# Patient Record
Sex: MALE | Race: WHITE | NOT HISPANIC OR LATINO | Employment: OTHER | URBAN - METROPOLITAN AREA
[De-identification: names, ages, dates, MRNs, and addresses within clinical notes are randomized per-mention and may not be internally consistent; named-entity substitution may affect disease eponyms.]

---

## 2017-01-14 ENCOUNTER — APPOINTMENT (OUTPATIENT)
Dept: RADIOLOGY | Facility: MEDICAL CENTER | Age: 62
DRG: 087 | End: 2017-01-14
Attending: EMERGENCY MEDICINE
Payer: COMMERCIAL

## 2017-01-14 ENCOUNTER — RESOLUTE PROFESSIONAL BILLING HOSPITAL PROF FEE (OUTPATIENT)
Dept: HOSPITALIST | Facility: MEDICAL CENTER | Age: 62
End: 2017-01-14
Payer: COMMERCIAL

## 2017-01-14 ENCOUNTER — HOSPITAL ENCOUNTER (INPATIENT)
Facility: MEDICAL CENTER | Age: 62
LOS: 1 days | DRG: 087 | End: 2017-01-15
Attending: EMERGENCY MEDICINE | Admitting: INTERNAL MEDICINE
Payer: COMMERCIAL

## 2017-01-14 DIAGNOSIS — S09.90XA CLOSED HEAD INJURY, INITIAL ENCOUNTER: ICD-10-CM

## 2017-01-14 DIAGNOSIS — R47.81 SLURRED SPEECH: ICD-10-CM

## 2017-01-14 DIAGNOSIS — R55 SYNCOPE, UNSPECIFIED SYNCOPE TYPE: ICD-10-CM

## 2017-01-14 LAB
ALBUMIN SERPL BCP-MCNC: 2.6 G/DL (ref 3.2–4.9)
ALBUMIN/GLOB SERPL: 0.5 G/DL
ALP SERPL-CCNC: 89 U/L (ref 30–99)
ALT SERPL-CCNC: 24 U/L (ref 2–50)
ANION GAP SERPL CALC-SCNC: 10 MMOL/L (ref 0–11.9)
APTT PPP: 27.3 SEC (ref 24.7–36)
AST SERPL-CCNC: 20 U/L (ref 12–45)
BASOPHILS # BLD AUTO: 0.6 % (ref 0–1.8)
BASOPHILS # BLD: 0.06 K/UL (ref 0–0.12)
BILIRUB SERPL-MCNC: 0.5 MG/DL (ref 0.1–1.5)
BNP SERPL-MCNC: 10 PG/ML (ref 0–100)
BUN SERPL-MCNC: 16 MG/DL (ref 8–22)
CALCIUM SERPL-MCNC: 9.2 MG/DL (ref 8.4–10.2)
CHLORIDE SERPL-SCNC: 103 MMOL/L (ref 96–112)
CO2 SERPL-SCNC: 24 MMOL/L (ref 20–33)
CREAT SERPL-MCNC: 1 MG/DL (ref 0.5–1.4)
EOSINOPHIL # BLD AUTO: 0.47 K/UL (ref 0–0.51)
EOSINOPHIL NFR BLD: 4.7 % (ref 0–6.9)
ERYTHROCYTE [DISTWIDTH] IN BLOOD BY AUTOMATED COUNT: 39.3 FL (ref 35.9–50)
GFR SERPL CREATININE-BSD FRML MDRD: >60 ML/MIN/1.73 M 2
GLOBULIN SER CALC-MCNC: 4.8 G/DL (ref 1.9–3.5)
GLUCOSE SERPL-MCNC: 88 MG/DL (ref 65–99)
HCT VFR BLD AUTO: 42.1 % (ref 42–52)
HGB BLD-MCNC: 14.9 G/DL (ref 14–18)
IMM GRANULOCYTES # BLD AUTO: 0.05 K/UL (ref 0–0.11)
IMM GRANULOCYTES NFR BLD AUTO: 0.5 % (ref 0–0.9)
INR PPP: 0.86 (ref 0.87–1.13)
LYMPHOCYTES # BLD AUTO: 3.15 K/UL (ref 1–4.8)
LYMPHOCYTES NFR BLD: 31.6 % (ref 22–41)
MCH RBC QN AUTO: 30.3 PG (ref 27–33)
MCHC RBC AUTO-ENTMCNC: 35.4 G/DL (ref 33.7–35.3)
MCV RBC AUTO: 85.7 FL (ref 81.4–97.8)
MONOCYTES # BLD AUTO: 0.76 K/UL (ref 0–0.85)
MONOCYTES NFR BLD AUTO: 7.6 % (ref 0–13.4)
NEUTROPHILS # BLD AUTO: 5.47 K/UL (ref 1.82–7.42)
NEUTROPHILS NFR BLD: 55 % (ref 44–72)
NRBC # BLD AUTO: 0 K/UL
NRBC BLD AUTO-RTO: 0 /100 WBC
PLATELET # BLD AUTO: 226 K/UL (ref 164–446)
PMV BLD AUTO: 10.1 FL (ref 9–12.9)
POTASSIUM SERPL-SCNC: 4.2 MMOL/L (ref 3.6–5.5)
PROT SERPL-MCNC: 7.4 G/DL (ref 6–8.2)
PROTHROMBIN TIME: 11.5 SEC (ref 12–14.6)
RBC # BLD AUTO: 4.91 M/UL (ref 4.7–6.1)
SODIUM SERPL-SCNC: 137 MMOL/L (ref 135–145)
TROPONIN I SERPL-MCNC: <0.02 NG/ML (ref 0–0.04)
WBC # BLD AUTO: 10 K/UL (ref 4.8–10.8)

## 2017-01-14 PROCEDURE — 80053 COMPREHEN METABOLIC PANEL: CPT

## 2017-01-14 PROCEDURE — 99223 1ST HOSP IP/OBS HIGH 75: CPT | Performed by: INTERNAL MEDICINE

## 2017-01-14 PROCEDURE — 770020 HCHG ROOM/CARE - TELE (206)

## 2017-01-14 PROCEDURE — 83880 ASSAY OF NATRIURETIC PEPTIDE: CPT

## 2017-01-14 PROCEDURE — 99407 BEHAV CHNG SMOKING > 10 MIN: CPT | Performed by: INTERNAL MEDICINE

## 2017-01-14 PROCEDURE — 85025 COMPLETE CBC W/AUTO DIFF WBC: CPT

## 2017-01-14 PROCEDURE — 70450 CT HEAD/BRAIN W/O DYE: CPT

## 2017-01-14 PROCEDURE — 71010 DX-CHEST-PORTABLE (1 VIEW): CPT

## 2017-01-14 PROCEDURE — 36415 COLL VENOUS BLD VENIPUNCTURE: CPT

## 2017-01-14 PROCEDURE — 84484 ASSAY OF TROPONIN QUANT: CPT

## 2017-01-14 PROCEDURE — 94760 N-INVAS EAR/PLS OXIMETRY 1: CPT

## 2017-01-14 PROCEDURE — 99285 EMERGENCY DEPT VISIT HI MDM: CPT

## 2017-01-14 PROCEDURE — 85730 THROMBOPLASTIN TIME PARTIAL: CPT

## 2017-01-14 PROCEDURE — 85610 PROTHROMBIN TIME: CPT

## 2017-01-14 PROCEDURE — 93005 ELECTROCARDIOGRAM TRACING: CPT | Performed by: EMERGENCY MEDICINE

## 2017-01-14 ASSESSMENT — PAIN SCALES - GENERAL
PAINLEVEL_OUTOF10: ASSUMED PAIN PRESENT
PAINLEVEL_OUTOF10: 5

## 2017-01-14 NOTE — IP AVS SNAPSHOT
1/15/2017          Rodolfo SOLIS III [facility]  [address]  [place] [address]    Dear Rodolfo SOLIS:    [facility] wants to ensure your discharge home is safe and you or your loved ones have had all your questions answered regarding your care after you leave the hospital.    You may receive a telephone call within two days of your discharge.  This call is to make certain you understand your discharge instructions as well as ensure we provided you with the best care possible during your stay with us.     The call will only last approximately 3-5 minutes and will be done by a nurse.    Once again, we want to ensure your discharge home is safe and that you have a clear understanding of any next steps in your care.  If you have any questions or concerns, please do not hesitate to contact us, we are here for you.  Thank you for choosing [facility] for your healthcare needs.    Sincerely,    Joe Longoria    [facility]

## 2017-01-14 NOTE — IP AVS SNAPSHOT
" After Visit Summary                                                                                                                  Name:Rodolfo Saunders  Medical Record Number:[id number]  CSN: [id number]    YOB: 1955   Age: 61 y.o.  Sex: male  HT:1.905 m (6' 3\") WT: 93 kg (205 lb 0.4 oz)          Admit Date: 1/14/2017     Discharge Date:   Today's Date: 1/15/2017  Attending Doctor:  Willy Kauffman M.D.                  Allergies:  Review of patient's allergies indicates no known allergies.            Discharge Instructions       Discharge Instructions    Discharged to home by car with relative. Discharged via wheelchair, hospital escort: Yes.  Special equipment needed: Not Applicable    Be sure to schedule a follow-up appointment with your primary care doctor or any specialists as instructed.     Discharge Plan:   Smoking Cessation Offered: Patient Counseled  Influenza Vaccine Indication: Patient Refuses    I understand that a diet low in cholesterol, fat, and sodium is recommended for good health. Unless I have been given specific instructions below for another diet, I accept this instruction as my diet prescription.   Other diet: Regular    Special Instructions: None    · Is patient discharged on Warfarin / Coumadin?   No     · Is patient Post Blood Transfusion?  No    Depression / Suicide Risk    As you are discharged from this [facility] facility, it is important to learn how to keep safe from harming yourself.    Recognize the warning signs:  · Abrupt changes in personality, positive or negative- including increase in energy   · Giving away possessions  · Change in eating patterns- significant weight changes-  positive or negative  · Change in sleeping patterns- unable to sleep or sleeping all the time   · Unwillingness or inability to communicate  · Depression  · Unusual sadness, discouragement and loneliness  · Talk of wanting to die  · Neglect of personal appearance   · Rebelliousness- " reckless behavior  · Withdrawal from people/activities they love  · Confusion- inability to concentrate     If you or a loved one observes any of these behaviors or has concerns about self-harm, here's what you can do:  · Talk about it- your feelings and reasons for harming yourself  · Remove any means that you might use to hurt yourself (examples: pills, rope, extension cords, firearm)  · Get professional help from the community (Mental Health, Substance Abuse, psychological counseling)  · Do not be alone:Call your Safe Contact- someone whom you trust who will be there for you.  · Call your local CRISIS HOTLINE 981-4573 or 105-131-3221  · Call your local Children's Mobile Crisis Response Team [place] (456) 670-3569 or www.Marketsync  · Call the toll free National Suicide Prevention Hotlines   · National Suicide Prevention Lifeline 089-374-WLDF (2144)  · [employer] App Line Network 800-JWERJRD (670-4115)    Syncope, commonly known as fainting, is a temporary loss of consciousness. It occurs when the blood flow to the brain is reduced. Vasovagal syncope (also called neurocardiogenic syncope) is a fainting spell in which the blood flow to the brain is reduced because of a sudden drop in heart rate and blood pressure. Vasovagal syncope occurs when the brain and the cardiovascular system (blood vessels) do not adequately communicate and respond to each other. This is the most common cause of fainting. It often occurs in response to fear or some other type of emotional or physical stress. The body has a reaction in which the heart starts beating too slowly or the blood vessels expand, reducing blood pressure. This type of fainting spell is generally considered harmless. However, injuries can occur if a person takes a sudden fall during a fainting spell.   CAUSES   Vasovagal syncope occurs when a person's blood pressure and heart rate decrease suddenly, usually in response to a trigger. Many things and  situations can trigger an episode. Some of these include:   · Pain.    · Fear.    · The sight of blood or medical procedures, such as blood being drawn from a vein.    · Common activities, such as coughing, swallowing, stretching, or going to the bathroom.    · Emotional stress.    · Prolonged standing, especially in a warm environment.    · Lack of sleep or rest.    · Prolonged lack of food.    · Prolonged lack of fluids.    · Recent illness.  · The use of certain drugs that affect blood pressure, such as cocaine, alcohol, marijuana, inhalants, and opiates.    SYMPTOMS   Before the fainting episode, you may:   · Feel dizzy or light headed.    · Become pale.  · Sense that you are going to faint.    · Feel like the room is spinning.    · Have tunnel vision, only seeing directly in front of you.    · Feel sick to your stomach (nauseous).    · See spots or slowly lose vision.    · Hear ringing in your ears.    · Have a headache.    · Feel warm and sweaty.    · Feel a sensation of pins and needles.  During the fainting spell, you will generally be unconscious for no longer than a couple minutes before waking up and returning to normal. If you get up too quickly before your body can recover, you may faint again. Some twitching or jerky movements may occur during the fainting spell.   DIAGNOSIS   Your health care provider will ask about your symptoms, take a medical history, and perform a physical exam. Various tests may be done to rule out other causes of fainting. These may include blood tests and tests to check the heart, such as electrocardiography, echocardiography, and possibly an electrophysiology study. When other causes have been ruled out, a test may be done to check the body's response to changes in position (tilt table test).  TREATMENT   Most cases of vasovagal syncope do not require treatment. Your health care provider may recommend ways to avoid fainting triggers and may provide home strategies for  preventing fainting. If you must be exposed to a possible trigger, you can drink additional fluids to help reduce your chances of having an episode of vasovagal syncope. If you have warning signs of an oncoming episode, you can respond by positioning yourself favorably (lying down).  If your fainting spells continue, you may be given medicines to prevent fainting. Some medicines may help make you more resistant to repeated episodes of vasovagal syncope. Special exercises or compression stockings may be recommended. In rare cases, the surgical placement of a pacemaker is considered.  HOME CARE INSTRUCTIONS   · Learn to identify the warning signs of vasovagal syncope.    · Sit or lie down at the first warning sign of a fainting spell. If sitting, put your head down between your legs. If you lie down, swing your legs up in the air to increase blood flow to the brain.    · Avoid hot tubs and saunas.  · Avoid prolonged standing.  · Drink enough fluids to keep your urine clear or pale yellow. Avoid caffeine.  · Increase salt in your diet as directed by your health care provider.    · If you have to stand for a long time, perform movements such as:    · Crossing your legs.    · Flexing and stretching your leg muscles.    · Squatting.    · Moving your legs.    · Bending over.    · Only take over-the-counter or prescription medicines as directed by your health care provider. Do not suddenly stop any medicines without asking your health care provider first.   SEEK MEDICAL CARE IF:   · Your fainting spells continue or happen more frequently in spite of treatment.    · You lose consciousness for more than a couple minutes.  · You have fainting spells during or after exercising or after being startled.    · You have new symptoms that occur with the fainting spells, such as:    · Shortness of breath.  · Chest pain.    · Irregular heartbeat.    · You have episodes of twitching or jerky movements that last longer than a few  seconds.  · You have episodes of twitching or jerky movements without obvious fainting.  SEEK IMMEDIATE MEDICAL CARE IF:   · You have injuries or bleeding after a fainting spell.    · You have episodes of twitching or jerky movements that last longer than 5 minutes.    · You have more than one spell of twitching or jerky movements before returning to consciousness after fainting.     This information is not intended to replace advice given to you by your health care provider. Make sure you discuss any questions you have with your health care provider.     Document Released: 12/04/2013 Document Revised: 05/03/2016 Document Reviewed: 12/04/2013  Greasebook Interactive Patient Education ©2016 [employer]    Sinusitis, Adult  Sinusitis is redness, soreness, and inflammation of the paranasal sinuses. Paranasal sinuses are air pockets within the bones of your face. They are located beneath your eyes, in the middle of your forehead, and above your eyes. In healthy paranasal sinuses, mucus is able to drain out, and air is able to circulate through them by way of your nose. However, when your paranasal sinuses are inflamed, mucus and air can become trapped. This can allow bacteria and other germs to grow and cause infection.  Sinusitis can develop quickly and last only a short time (acute) or continue over a long period (chronic). Sinusitis that lasts for more than 12 weeks is considered chronic.  CAUSES  Causes of sinusitis include:  · Allergies.  · Structural abnormalities, such as displacement of the cartilage that separates your nostrils (deviated septum), which can decrease the air flow through your nose and sinuses and affect sinus drainage.  · Functional abnormalities, such as when the small hairs (cilia) that line your sinuses and help remove mucus do not work properly or are not present.  SIGNS AND SYMPTOMS  Symptoms of acute and chronic sinusitis are the same. The primary symptoms are pain and pressure around the  affected sinuses. Other symptoms include:  · Upper toothache.  · Earache.  · Headache.  · Bad breath.  · Decreased sense of smell and taste.  · A cough, which worsens when you are lying flat.  · Fatigue.  · Fever.  · Thick drainage from your nose, which often is green and may contain pus (purulent).  · Swelling and warmth over the affected sinuses.  DIAGNOSIS  Your health care provider will perform a physical exam. During your exam, your health care provider may perform any of the following to help determine if you have acute sinusitis or chronic sinusitis:  · Look in your nose for signs of abnormal growths in your nostrils (nasal polyps).  · Tap over the affected sinus to check for signs of infection.  · View the inside of your sinuses using an imaging device that has a light attached (endoscope).  If your health care provider suspects that you have chronic sinusitis, one or more of the following tests may be recommended:  · Allergy tests.  · Nasal culture. A sample of mucus is taken from your nose, sent to a lab, and screened for bacteria.  · Nasal cytology. A sample of mucus is taken from your nose and examined by your health care provider to determine if your sinusitis is related to an allergy.  TREATMENT  Most cases of acute sinusitis are related to a viral infection and will resolve on their own within 10 days. Sometimes, medicines are prescribed to help relieve symptoms of both acute and chronic sinusitis. These may include pain medicines, decongestants, nasal steroid sprays, or saline sprays.  However, for sinusitis related to a bacterial infection, your health care provider will prescribe antibiotic medicines. These are medicines that will help kill the bacteria causing the infection.  Rarely, sinusitis is caused by a fungal infection. In these cases, your health care provider will prescribe antifungal medicine.  For some cases of chronic sinusitis, surgery is needed. Generally, these are cases in which  sinusitis recurs more than 3 times per year, despite other treatments.  HOME CARE INSTRUCTIONS  · Drink plenty of water. Water helps thin the mucus so your sinuses can drain more easily.  · Use a humidifier.  · Inhale steam 3-4 times a day (for example, sit in the bathroom with the shower running).  · Apply a warm, moist washcloth to your face 3-4 times a day, or as directed by your health care provider.  · Use saline nasal sprays to help moisten and clean your sinuses.  · Take medicines only as directed by your health care provider.  · If you were prescribed either an antibiotic or antifungal medicine, finish it all even if you start to feel better.  SEEK IMMEDIATE MEDICAL CARE IF:  · You have increasing pain or severe headaches.  · You have nausea, vomiting, or drowsiness.  · You have swelling around your face.  · You have vision problems.  · You have a stiff neck.  · You have difficulty breathing.     This information is not intended to replace advice given to you by your health care provider. Make sure you discuss any questions you have with your health care provider.     Document Released: 12/18/2006 Document Revised: 01/08/2016 Document Reviewed: 01/01/2013  [employer] Interactive Patient Education ©2016 Elsevier Inc.        Your appointments     Jan 16, 2017  1:20 PM   Established Patient with RUFINA Manriquez   [facility] (--)    [address]  [address]  [place] [id number] [phone]           You will be receiving a confirmation call a few days before your appointment from our automated call confirmation system.              Follow-up Information     1. Follow up with Deandre De La Cruz M.D..    Specialty:  Internal Medicine    Contact information    [address]  [place] [id number] [phone]           Discharge Medication Instructions:    Below are the medications your physician expects you to take upon discharge:    Review all your home medications and newly  ordered medications with your doctor and/or pharmacist. Follow medication instructions as directed by your doctor and/or pharmacist.    Please keep your medication list with you and share with your physician.               Medication List      CONTINUE taking these medications        Instructions    Diclofenac Sodium 1 % Gel    Apply 1 Application to affected area(s) 2 times a day as needed.   Dose:  1 Application       ibuprofen 800 MG Tabs   Last time this was given:  800 mg on 1/15/2017  1:42 AM   Commonly known as:  MOTRIN    Take 800 mg by mouth every 8 hours as needed.   Dose:  800 mg               Instructions           Diet / Nutrition:    Follow any diet instructions given to you by your doctor or the dietician, including how much salt (sodium) you are allowed each day.    If you are overweight, talk to your doctor about a weight reduction plan.    Activity:    Remain physically active following your doctor's instructions about exercise and activity.    Rest often.     Any time you become even a little tired or short of breath, SIT DOWN and rest.    Worsening Symptoms:    Report any of the following signs and symptoms to the doctor's office immediately:    *Pain of jaw, arm, or neck  *Chest pain not relieved by medication                               *Dizziness or loss of consciousness  *Difficulty breathing even when at rest   *More tired than usual                                       *Bleeding drainage or swelling of surgical site  *Swelling of feet, ankles, legs or stomach                 *Fever (>100ºF)  *Pink or blood tinged sputum  *Weight gain (3lbs/day or 5lbs /week)           *Shock from internal defibrillator (if applicable)  *Palpitations or irregular heartbeats                *Cool and/or numb extremities    Stroke Awareness    Common Risk Factors for Stroke include:    Age  Atrial Fibrillation  Carotid Artery Stenosis  Diabetes Mellitus  Excessive alcohol consumption  High blood  pressure  Overweight   Physical inactivity  Smoking    Warning signs and symptoms of a stroke include:    *Sudden numbness or weakness of the face, arm or leg (especially on one side of the body).  *Sudden confusion, trouble speaking or understanding.  *Sudden trouble seeing in one or both eyes.  *Sudden trouble walking, dizziness, loss of balance or coordination.Sudden severe headache with no known cause.    It is very important to get treatment quickly when a stroke occurs. If you experience any of the above warning signs, call 911 immediately.                   Disclaimer         Quit Smoking / Tobacco Use:    I understand the use of any tobacco products increases my chance of suffering from future heart disease or stroke and could cause other illnesses which may shorten my life. Quitting the use of tobacco products is the single most important thing I can do to improve my health. For further information on smoking / tobacco cessation call a Toll Free Quit Line at 1-384.505.5027 (*National Cancer [place]) or 1-336.626.9048 (American Lung Association) or you can access the web based program at www.lungReadmill.org.    [place] Tobacco Users Help Line:  (195) 212-3271       Toll Free: 1-644.153.9745  Quit Tobacco Program [facility] (038)579-7483    Crisis Hotline:    [place] Crisis Hotline:  8-232-FYNJEIF or 1-695.906.6160    [place] Crisis Hotline:    1-701.962.2929 or 206-246-3153    Discharge Survey:   Thank you for choosing [facility]. We hope we did everything we could to make your hospital stay a pleasant one. You may be receiving a phone survey and we would appreciate your time and participation in answering the questions. Your input is very valuable to us in our efforts to improve our service to our patients and their families.        My signature on this form indicates that:    1. I have reviewed and understand the above information.  2. My questions regarding this information have  been answered to my satisfaction.  3. I have formulated a plan with my discharge nurse to obtain my prescribed medications for home.                  Disclaimer         __________________________________                     __________       ________                       Patient Signature                                                 Date                    Time

## 2017-01-14 NOTE — IP AVS SNAPSHOT
XATA Access Code: Activation code not generated  Current XATA Status: Patient Declined    [employer]  A secure, online tool to manage your health information     [employer]’s XATA® is a secure, online tool that connects you to your personalized health information from the privacy of your home -- day or night - making it very easy for you to manage your healthcare. Once the activation process is completed, you can even access your medical information using the XATA miquel, which is available for free in the Apple Miquel store or Google Play store.     XATA provides the following levels of access (as shown below):   My Chart Features   [facility] Primary Care Doctor [facility]  Specialists [facility]  Urgent  Care Non-[facility]  Primary Care  Doctor   Email your healthcare team securely and privately 24/7 X X X X   Manage appointments: schedule your next appointment; view details of past/upcoming appointments X      Request prescription refills. X      View recent personal medical records, including lab and immunizations X X X X   View health record, including health history, allergies, medications X X X X   Read reports about your outpatient visits, procedures, consult and ER notes X X X X   See your discharge summary, which is a recap of your hospital and/or ER visit that includes your diagnosis, lab results, and care plan. X X       How to register for XATA:  1. Go to  [email or web address].  2. Click on the Sign Up Now box, which takes you to the New Member Sign Up page. You will need to provide the following information:  a. Enter your XATA Access Code exactly as it appears at the top of this page. (You will not need to use this code after you’ve completed the sign-up process. If you do not sign up before the expiration date, you must request a new code.)   b. Enter your date of birth.   c. Enter your home email address.   d. Click Submit, and follow the next screen’s instructions.  3. Create a XATA ID.  This will be your [employer] login ID and cannot be changed, so think of one that is secure and easy to remember.  4. Create a [employer] password. You can change your password at any time.  5. Enter your Password Reset Question and Answer. This can be used at a later time if you forget your password.   6. Enter your e-mail address. This allows you to receive e-mail notifications when new information is available in [employer].  7. Click Sign Up. You can now view your health information.    For assistance activating your [employer] account, call [phone]

## 2017-01-14 NOTE — IP AVS SNAPSHOT
" <p align=\"LEFT\"><IMG SRC=\"//[email or web address]\" alt=\"Image\" WIDTH=\"50%\" HEIGHT=\"200\" BORDER=\"\"></p>                   Name:Rodolfo Saunders  Medical Record Number:[id number]  CSN: [id number]    YOB: 1955   Age: 61 y.o.  Sex: male  HT:1.905 m (6' 3\") WT: 93 kg (205 lb 0.4 oz)          Admit Date: 1/14/2017     Discharge Date:   Today's Date: 1/15/2017  Attending Doctor:  Willy Kauffman M.D.                  Allergies:  Review of patient's allergies indicates no known allergies.          Your appointments     Jan 16, 2017  1:20 PM   Established Patient with RUFINA Manriquez   [facility] [place] (--)    [address]  [address]  [place] [address] [phone]           You will be receiving a confirmation call a few days before your appointment from our automated call confirmation system.              Follow-up Information     1. Follow up with Deandre De La Cruz M.D..    Specialty:  Internal Medicine    Contact information    [address]  [place] [address] [phone]           Medication List      Take these Medications        Instructions    Diclofenac Sodium 1 % Gel    Apply 1 Application to affected area(s) 2 times a day as needed.   Dose:  1 Application       ibuprofen 800 MG Tabs   Commonly known as:  MOTRIN    Take 800 mg by mouth every 8 hours as needed.   Dose:  800 mg         "

## 2017-01-15 ENCOUNTER — APPOINTMENT (OUTPATIENT)
Dept: RADIOLOGY | Facility: MEDICAL CENTER | Age: 62
DRG: 087 | End: 2017-01-15
Attending: INTERNAL MEDICINE
Payer: COMMERCIAL

## 2017-01-15 VITALS
TEMPERATURE: 97.6 F | HEART RATE: 76 BPM | BODY MASS INDEX: 25.49 KG/M2 | DIASTOLIC BLOOD PRESSURE: 82 MMHG | SYSTOLIC BLOOD PRESSURE: 152 MMHG | WEIGHT: 205.03 LBS | HEIGHT: 75 IN | OXYGEN SATURATION: 92 % | RESPIRATION RATE: 19 BRPM

## 2017-01-15 LAB
EKG IMPRESSION: NORMAL
LV EJECT FRACT  99904: 60
LV EJECT FRACT MOD 2C 99903: 59.64
LV EJECT FRACT MOD 4C 99902: 61.98
LV EJECT FRACT MOD BP 99901: 61.71

## 2017-01-15 PROCEDURE — 93880 EXTRACRANIAL BILAT STUDY: CPT

## 2017-01-15 PROCEDURE — 700111 HCHG RX REV CODE 636 W/ 250 OVERRIDE (IP): Performed by: INTERNAL MEDICINE

## 2017-01-15 PROCEDURE — 70551 MRI BRAIN STEM W/O DYE: CPT

## 2017-01-15 PROCEDURE — 700102 HCHG RX REV CODE 250 W/ 637 OVERRIDE(OP): Performed by: INTERNAL MEDICINE

## 2017-01-15 PROCEDURE — 700105 HCHG RX REV CODE 258: Performed by: INTERNAL MEDICINE

## 2017-01-15 PROCEDURE — 93306 TTE W/DOPPLER COMPLETE: CPT

## 2017-01-15 PROCEDURE — 99239 HOSP IP/OBS DSCHRG MGMT >30: CPT | Performed by: HOSPITALIST

## 2017-01-15 PROCEDURE — A9270 NON-COVERED ITEM OR SERVICE: HCPCS | Performed by: INTERNAL MEDICINE

## 2017-01-15 PROCEDURE — 93306 TTE W/DOPPLER COMPLETE: CPT | Mod: 26 | Performed by: INTERNAL MEDICINE

## 2017-01-15 RX ORDER — ONDANSETRON 4 MG/1
4 TABLET, ORALLY DISINTEGRATING ORAL EVERY 4 HOURS PRN
Status: DISCONTINUED | OUTPATIENT
Start: 2017-01-15 | End: 2017-01-15 | Stop reason: HOSPADM

## 2017-01-15 RX ORDER — ACETAMINOPHEN 325 MG/1
650 TABLET ORAL EVERY 6 HOURS PRN
Status: DISCONTINUED | OUTPATIENT
Start: 2017-01-15 | End: 2017-01-15 | Stop reason: HOSPADM

## 2017-01-15 RX ORDER — PROMETHAZINE HYDROCHLORIDE 25 MG/1
12.5-25 SUPPOSITORY RECTAL EVERY 4 HOURS PRN
Status: DISCONTINUED | OUTPATIENT
Start: 2017-01-15 | End: 2017-01-15 | Stop reason: HOSPADM

## 2017-01-15 RX ORDER — BISACODYL 10 MG
10 SUPPOSITORY, RECTAL RECTAL
Status: DISCONTINUED | OUTPATIENT
Start: 2017-01-15 | End: 2017-01-15 | Stop reason: HOSPADM

## 2017-01-15 RX ORDER — AMOXICILLIN 250 MG
1 CAPSULE ORAL
Status: DISCONTINUED | OUTPATIENT
Start: 2017-01-15 | End: 2017-01-15 | Stop reason: HOSPADM

## 2017-01-15 RX ORDER — AMOXICILLIN 250 MG
1 CAPSULE ORAL NIGHTLY
Status: DISCONTINUED | OUTPATIENT
Start: 2017-01-15 | End: 2017-01-15 | Stop reason: HOSPADM

## 2017-01-15 RX ORDER — ENEMA 19; 7 G/133ML; G/133ML
1 ENEMA RECTAL
Status: DISCONTINUED | OUTPATIENT
Start: 2017-01-15 | End: 2017-01-15 | Stop reason: HOSPADM

## 2017-01-15 RX ORDER — LACTULOSE 20 G/30ML
30 SOLUTION ORAL
Status: DISCONTINUED | OUTPATIENT
Start: 2017-01-15 | End: 2017-01-15 | Stop reason: HOSPADM

## 2017-01-15 RX ORDER — HEPARIN SODIUM 5000 [USP'U]/ML
5000 INJECTION, SOLUTION INTRAVENOUS; SUBCUTANEOUS EVERY 8 HOURS
Status: DISCONTINUED | OUTPATIENT
Start: 2017-01-15 | End: 2017-01-15 | Stop reason: HOSPADM

## 2017-01-15 RX ORDER — ONDANSETRON 2 MG/ML
4 INJECTION INTRAMUSCULAR; INTRAVENOUS EVERY 4 HOURS PRN
Status: DISCONTINUED | OUTPATIENT
Start: 2017-01-15 | End: 2017-01-15 | Stop reason: HOSPADM

## 2017-01-15 RX ORDER — PROMETHAZINE HYDROCHLORIDE 25 MG/1
12.5-25 TABLET ORAL EVERY 4 HOURS PRN
Status: DISCONTINUED | OUTPATIENT
Start: 2017-01-15 | End: 2017-01-15 | Stop reason: HOSPADM

## 2017-01-15 RX ORDER — IBUPROFEN 400 MG/1
800 TABLET ORAL EVERY 8 HOURS PRN
Status: DISCONTINUED | OUTPATIENT
Start: 2017-01-15 | End: 2017-01-15 | Stop reason: HOSPADM

## 2017-01-15 RX ORDER — DOCUSATE SODIUM 100 MG/1
100 CAPSULE, LIQUID FILLED ORAL EVERY MORNING
Status: DISCONTINUED | OUTPATIENT
Start: 2017-01-15 | End: 2017-01-15 | Stop reason: HOSPADM

## 2017-01-15 RX ORDER — SODIUM CHLORIDE 9 MG/ML
INJECTION, SOLUTION INTRAVENOUS CONTINUOUS
Status: DISCONTINUED | OUTPATIENT
Start: 2017-01-15 | End: 2017-01-15 | Stop reason: HOSPADM

## 2017-01-15 RX ADMIN — IBUPROFEN 800 MG: 400 TABLET, FILM COATED ORAL at 01:42

## 2017-01-15 RX ADMIN — HEPARIN SODIUM 5000 UNITS: 5000 INJECTION, SOLUTION INTRAVENOUS; SUBCUTANEOUS at 06:41

## 2017-01-15 RX ADMIN — SODIUM CHLORIDE: 9 INJECTION, SOLUTION INTRAVENOUS at 01:53

## 2017-01-15 RX ADMIN — HEPARIN SODIUM 5000 UNITS: 5000 INJECTION, SOLUTION INTRAVENOUS; SUBCUTANEOUS at 13:25

## 2017-01-15 ASSESSMENT — PAIN SCALES - GENERAL
PAINLEVEL_OUTOF10: 3
PAINLEVEL_OUTOF10: 5

## 2017-01-15 ASSESSMENT — LIFESTYLE VARIABLES
ALCOHOL_USE: NO
EVER_SMOKED: YES
PACK_YEARS: 5

## 2017-01-15 NOTE — PROGRESS NOTES
Late entry:    0700 Report received from Alex HDZ. Patient resting in bed.      1241: MRI, ECHO, CAROTID DOPPLER all complete.

## 2017-01-15 NOTE — ED NOTES
Pt states three nights ago he was sitting outside drinking coffee and smoking a cigarette, stood up, walked inside et began to feel dizzy.  He fell et his his head, then had a similar episode last night.  C/O headache, also is worried about blood sugar issues et feels it may be related.

## 2017-01-15 NOTE — PROGRESS NOTES
Pt's tele summary: sinus rhythm w/no ectopy.      HR 77      AK interval 0.20  QRS complex 0.10  QT interval 0.38      Primary nurse to continue further monitoring and keep in communication with monitor tech.

## 2017-01-15 NOTE — H&P
PRIMARY CARE PHYSICIAN:  Dr. Grayson    CHIEF COMPLAINT:  Syncope.    HISTORY OF PRESENT ILLNESS:  The patient is 61-year-old male who came to the   ER with above.  Per the patient, over the past 3 days, he has one episode of   syncope attack and 2 episodes of slurred speech.  Per the patient, about 3   days ago while he was at the kitchen, he suddenly felt dizzy and fell and   passed out.  Per wife, he passed out for a few minutes and when he regained   consciousness, he was confused for a few minutes and she noticed that he has   heavy speech.  Today, again he has another episode of heavy speech and slurred   speech.  So they finally decided to come to the ER.  In the ER, he has a CT   scan of the head done and it was negative.  Apart from that, the patient   denied any fever, chest pain, palpitation, abdominal pain, bowel or bladder   problem, or any neurological deficit.    REVIEW OF SYSTEMS:  All other systems were reviewed and negative.  The rest   per HPI.    ALLERGY HISTORY:  No known drug allergy.    PAST MEDICAL HISTORY:  No significant past medical history.    PAST SURGICAL HISTORY:  Sinus surgery and hernia repair.    FAMILY HISTORY:  No pertinent family history.    SOCIAL HISTORY:  The patient smoked 5 to 6 cigarettes a day.  Denies illicit   drug abuse or alcohol abuse.    OUTPATIENT MEDICATION:  Ibuprofen as needed and diclofenac sodium gel as   needed.    PHYSICAL EXAMINATION:  VITAL SIGNS:  Temperature 97.2, pulse 81, respiratory rate 16, blood pressure   158/100, satting 96% on room air.  GENERAL:  Alert, communicative.  HEENT:  Normocephalic, atraumatic.  NECK:  Supple.  No neck gland enlargement.  CARDIOVASCULAR:  Normal first and second sound.  No murmur.  RESPIRATORY:  Normal respiratory effort.  No wheezing.  ABDOMEN:  Soft, bowel sounds present.  Nontender.  CENTRAL NERVOUS SYSTEM:  Cranial nerves II-XII intact.  No neurological   deficit.  EXTREMITIES:  No edema, clubbing, or  cyanosis.  PSYCHIATRIC:  Normal affect and mood.  Alert and oriented x4.  SKIN:  Warm and moist.    LABORATORY DATA:  CBC within normal limit.  Chem panel is within normal limit.    CT scan of the head is within normal limit.  Chest x-ray is negative.    ASSESSMENT AND PLAN:  1.  Syncope episode, likely vasovagal versus arrhythmia related.  The patient   will be monitored closely on telemetry for possible arrhythmia related   syncope.  Also, we will do a complete syncope workup including MRI of the   brain, carotid duplex, and echocardiogram.  2.  If all the blood tests came back negative, then the patient probably will   need to get outpatient Holter monitor from primary care physician.  3.  Nicotine dependence.  Smoking cessation education was given.  I spent 10   minutes on explaining complication, treatment option.       ____________________________________     MD NOEMI ZARAGOZA / JENNIFER    DD:  01/15/2017 00:21:22  DT:  01/15/2017 02:05:34    D#:  [id number]  Job#:  [id number]

## 2017-01-15 NOTE — ED PROVIDER NOTES
ED Provider Note    CHIEF COMPLAINT  Chief Complaint   Patient presents with   • Head Ache   • Fall   • Head Injury   • Dizziness   • Syncope       HPI  Rodolfo Saunders is a 61 y.o. male who presents to the ED with a syncopal episode. Prior to his ankle episode the patient has been complaining of headaches fairly constant over the last 5 days diffusely throughout the head. 3 nights ago the patient was sitting outside approximately 1:00 in the morning drinking a cup of coffee and smoking a cigarette, he got up, started feeling lightheaded, tried to put his hand on a table however slipped fell and struck his head and had a syncopal episode. Unknown how long the patient was out, the patient woke up the patient was disoriented and had slurred speech. The slurred speech lasted for 5-10 minutes, the patient is complaining of some fatigue, and the patient went to bed. Since that time the patient continues to have headaches, has been feeling somewhat lightheaded and fatigued. Today early this morning the patient had another episode where he stood up after drinking coffee and smoking a cigarette and had lightheadedness and slurred speech however did not completely pass out. Patient denies any other numbness tingling weakness except for some chronic right hand pain and tingling. Continues to have the headache, denies any chest pain, did have an episode of shortness of breath with the 1st syncopal episode. Denies any abdominal pains, nausea vomiting.    REVIEW OF SYSTEMS  See HPI for further details. All other systems are negative.     PAST MEDICAL HISTORY  History reviewed. No pertinent past medical history.    FAMILY HISTORY  Family History   Problem Relation Age of Onset   • Diabetes Father    • Heart Disease Neg Hx    • Cancer Neg Hx        SOCIAL HISTORY  Social History     Social History   • Marital Status:      Spouse Name: N/A   • Number of Children: N/A   • Years of Education: N/A     Social History Main  "Topics   • Smoking status: Current Some Day Smoker -- 0.25 packs/day for 20 years     Types: Cigarettes   • Smokeless tobacco: Never Used   • Alcohol Use: No   • Drug Use: No   • Sexual Activity:     Partners: Female     Other Topics Concern   • None     Social History Narrative       SURGICAL HISTORY  Past Surgical History   Procedure Laterality Date   • Other orthopedic surgery Right      right ankle surgery   • Sinuscopy     • Hernia repair Right      inguinal hernia       CURRENT MEDICATIONS  Home Medications     Reviewed by Charlie Rodriguez R.N. (Registered Nurse) on 01/14/17 at 1906  Med List Status: Complete    Medication Last Dose Status    Diclofenac Sodium 1 % Gel 1/10/2017 Active    ibuprofen (MOTRIN) 800 MG Tab 1/14/2017 Active                ALLERGIES  No Known Allergies    PHYSICAL EXAM  VITAL SIGNS: /100 mmHg  Pulse 81  Temp(Src) 36.2 °C (97.2 °F)  Resp 16  Ht 1.905 m (6' 3\")  Wt 93 kg (205 lb 0.4 oz)  BMI 25.63 kg/m2  SpO2 94%  Constitutional: Well developed, Well nourished, mild distress, Non-toxic appearance.   HENT: Abrasion to left forehead, no hematoma  Eyes: EOMI, PERRLA  Neck: Normal range of motion, No tenderness, Supple, No stridor.   Cardiovascular: Normal heart rate, Normal rhythm.   Thorax & Lungs: Normal breath sounds, No respiratory distress, No wheezing, No chest tenderness.   Abdomen: Bowel sounds normal, Soft, No tenderness, No masses, No pulsatile masses.   Skin: Warm, Dry, No erythema, No rash.   Back: No tenderness, No CVA tenderness.   Extremities: Intact distal pulses, No edema, tenderness throughout the right hand with decreased ability to make a fist  Neurologic: Awake alert speech is clear, well-nourished 2 through 12 grossly intact muscle strength is 5 out of 5 throughout, normal sensation  Psychiatric: Affect normal, Judgment normal, Mood normal.     EKG   by myself shows normal sinus rhythm heart rate of 80, normal P waves, normal QRS, peaked T " waves throughout concerning for hyperkalemia, normal ST and T waves, normal axis, normal intervals, overall nonspecific EKG, no old EKG for comparison    RADIOLOGY/PROCEDURES  DX-CHEST-PORTABLE (1 VIEW)   Final Result      No acute cardiac or pulmonary abnormalities are identified.      CT-HEAD W/O   Final Result      Head CT without contrast within normal limits. No evidence of acute cerebral infarction, hemorrhage or mass lesion.            COURSE & MEDICAL DECISION MAKING  Pertinent Labs & Imaging studies reviewed. (See chart for details)  Patient is coming in with a syncopal episode although this is likely orthostatic syncope the patient has been having a persistent headache and had intermittent episodes of slurred speech that I am concerned about a TIA, we got a CT scan of the head, basic laboratory tests.    CT scan is negative laboratory tests are unremarkable discussed the case with the hospitalist for admission to hospital.    FINAL IMPRESSION  1. Closed head injury, initial encounter    2. Syncope, unspecified syncope type    3. Slurred speech            This dictation was created using voice recognition software. The accuracy of the dictation is limited to the abilities of the software. I expect there may be some errors of grammar and possibly content. The nursing notes were reviewed and certain aspects of this information were incorporated into this note.    Electronically signed by: Raulito Jeffries, 1/14/2017 8:29 PM

## 2017-01-15 NOTE — PROGRESS NOTES
0000    Report taken, assumed care of Pt. Pt c/o ongoing headache and L ankle pain, will medicate according to MAR. Reviewed POC for the shift and all questions answered. Call light and possessions within reach. Pt denies needs at this time. Wife BS. VSS. Will continue to monitor.

## 2017-01-16 ENCOUNTER — OFFICE VISIT (OUTPATIENT)
Dept: MEDICAL GROUP | Facility: PHYSICIAN GROUP | Age: 62
End: 2017-01-16
Payer: COMMERCIAL

## 2017-01-16 VITALS
HEIGHT: 75 IN | WEIGHT: 200 LBS | RESPIRATION RATE: 16 BRPM | TEMPERATURE: 97.9 F | DIASTOLIC BLOOD PRESSURE: 80 MMHG | OXYGEN SATURATION: 94 % | SYSTOLIC BLOOD PRESSURE: 148 MMHG | HEART RATE: 98 BPM | BODY MASS INDEX: 24.87 KG/M2

## 2017-01-16 DIAGNOSIS — F17.210 CIGARETTE NICOTINE DEPENDENCE WITHOUT COMPLICATION: ICD-10-CM

## 2017-01-16 DIAGNOSIS — M25.571 CHRONIC PAIN OF RIGHT ANKLE: ICD-10-CM

## 2017-01-16 DIAGNOSIS — I10 ESSENTIAL HYPERTENSION: ICD-10-CM

## 2017-01-16 DIAGNOSIS — R89.2 ABNORMAL DRUG SCREEN: ICD-10-CM

## 2017-01-16 DIAGNOSIS — I67.1 INTERNAL CAROTID ANEURYSM: ICD-10-CM

## 2017-01-16 DIAGNOSIS — G89.29 CHRONIC PAIN OF RIGHT ANKLE: ICD-10-CM

## 2017-01-16 DIAGNOSIS — R55 SYNCOPE, UNSPECIFIED SYNCOPE TYPE: ICD-10-CM

## 2017-01-16 PROCEDURE — 99214 OFFICE O/P EST MOD 30 MIN: CPT | Performed by: NURSE PRACTITIONER

## 2017-01-16 RX ORDER — HYDROCODONE BITARTRATE AND ACETAMINOPHEN 10; 325 MG/1; MG/1
1 TABLET ORAL EVERY 8 HOURS PRN
Qty: 60 TAB | Refills: 0 | Status: SHIPPED | OUTPATIENT
Start: 2017-01-16

## 2017-01-16 RX ORDER — HYDROCODONE BITARTRATE AND ACETAMINOPHEN 10; 325 MG/1; MG/1
1-2 TABLET ORAL EVERY 6 HOURS PRN
COMMUNITY
End: 2017-01-16 | Stop reason: SDUPTHER

## 2017-01-16 RX ORDER — LISINOPRIL 10 MG/1
10 TABLET ORAL DAILY
Qty: 30 TAB | Refills: 0 | Status: SHIPPED | OUTPATIENT
Start: 2017-01-16 | End: 2017-02-13

## 2017-01-16 NOTE — DISCHARGE SUMMARY
ADMISSION DIAGNOSIS:  Syncope.    DISCHARGE DIAGNOSES:  1.  Syncope, resolved.  2.  Nicotine dependence.    CONSULTS:  None.    PROCEDURES:  None.    HOSPITAL COURSE:  Patient is a 61-year-old male who has a history of ongoing   tobacco dependence.  He presented to the hospital after a syncopal event.    Patient reports that he went outside to have a cigarette and along with that   he had some coffee and not enough fluid intake throughout the day and he   became lightheaded and had a syncopal event.  He has never had episodes such   as this before.  During the hospitalization, he was monitored on telemetry.    There were no events on telemetry.  He had a full workup, which was   unremarkable and the workup included echocardiogram, CT head, MRI brain as   well as carotid ultrasound.  To note, his carotid ultrasound did show an area   of focal defect at the origin of the right internal carotid artery.  To   evaluate this further, he may need a CT angiogram at some point.  I discussed   this with the patient.  He states he is going to follow up with his primary   care physician to go over this study and to make a plan from there and he   would like to be discharged home today.  He is going to be discharged home in   stable condition.    DISCHARGE ACTIVITY:  As tolerated.    DISCHARGE DIET:  Regular.    DISCHARGE MEDICATIONS:  He should continue with Motrin as needed and   diclofenac.    TOTAL TIME OF DISCHARGE:  Thirty-five minutes.       ____________________________________     MD DYLAN Johnson / JENNIFER    DD:  01/15/2017 16:03:03  DT:  01/15/2017 20:39:33    D#:  [id number]  Job#:  [id number]

## 2017-01-16 NOTE — PROGRESS NOTES
Discharge instructions, medications reviewed with patient. Patient given discharge instructions,  IV removed.

## 2017-01-16 NOTE — MR AVS SNAPSHOT
"        Rodolfo Saunders III   2017 1:20 PM   Office Visit   MRN: [id number]    Department:  [facility]   Dept Phone:  [phone]    Description:  Male : 1955   Provider:  RUFINA Manriquez           Reason for Visit     Follow-Up 1 Month Follow Up       Allergies as of 2017     No Known Allergies      You were diagnosed with     Internal carotid aneurysm   [606858]       Essential hypertension   [4758386]       Chronic pain of right ankle   [4563747]       Cigarette nicotine dependence without complication   [536992]         Vital Signs     Blood Pressure Pulse Temperature Respirations Height Weight    148/80 mmHg 98 36.6 °C (97.9 °F) 16 1.905 m (6' 3\") 90.719 kg (200 lb)    Body Mass Index Oxygen Saturation Smoking Status             25.00 kg/m2 94% Current Some Day Smoker         Basic Information     Date Of Birth Sex Race Ethnicity Preferred Language    1955 Male White Non- English      Your appointments     2017  7:00 AM   Established Patient with DINO ManriquezPJuanaRNELSON   [facility] (--)    [address]  [address]  [facility] [address] [phone]           You will be receiving a confirmation call a few days before your appointment from our automated call confirmation system.              Problem List              ICD-10-CM Priority Class Noted - Resolved    Essential hypertension I10   2016 - Present    Cigarette nicotine dependence without complication F17.210   2016 - Present    Chronic pain of right ankle M25.571, G89.29   2016 - Present    Routine general medical examination at a health care facility Z00.00   2016 - Present    Syncope R55   2017 - Present    Internal carotid aneurysm I67.1   2017 - Present      Health Maintenance        Date Due Completion Dates    IMM PNEUMOCOCCAL 19-64 (ADULT) MEDIUM RISK SERIES (1 of 1 - PPSV23) 1976 ---    COLONOSCOPY 2007 ---  "    IMM INFLUENZA (1) 1/1/2018 (Originally 9/1/2016) ---    IMM DTaP/Tdap/Td Vaccine (2 - Td) 1/16/2024 1/16/2014            Current Immunizations     Tdap Vaccine 1/16/2014      Below and/or attached are the medications your provider expects you to take. Review all of your home medications and newly ordered medications with your provider and/or pharmacist. Follow medication instructions as directed by your provider and/or pharmacist. Please keep your medication list with you and share with your provider. Update the information when medications are discontinued, doses are changed, or new medications (including over-the-counter products) are added; and carry medication information at all times in the event of emergency situations     Allergies:  No Known Allergies          Medications  Valid as of: January 16, 2017 -  2:14 PM    Generic Name Brand Name Tablet Size Instructions for use    Diclofenac Sodium (Gel) Diclofenac Sodium 1 % Apply 1 Application to affected area(s) 2 times a day as needed.        Hydrocodone-Acetaminophen (Tab) NORCO  MG Take 1 Tab by mouth every 8 hours as needed.        Ibuprofen (Tab) MOTRIN 800 MG Take 800 mg by mouth every 8 hours as needed.        Lisinopril (Tab) PRINIVIL 10 MG Take 1 Tab by mouth every day.        .                 Medicines prescribed today were sent to:     [id number] - [place] - [address] [address]    Phone: [phone] Fax: [phone]    Open 24 Hours?: No      Medication refill instructions:       If your prescription bottle indicates you have medication refills left, it is not necessary to call your provider’s office. Please contact your pharmacy and they will refill your medication.    If your prescription bottle indicates you do not have any refills left, you may request refills at any time through one of the following ways: The online [facility] system (except Urgent Care), by calling your provider’s office, or by asking  your pharmacy to contact your provider’s office with a refill request. Medication refills are processed only during regular business hours and may not be available until the next business day. Your provider may request additional information or to have a follow-up visit with you prior to refilling your medication.   *Please Note: Medication refills are assigned a new Rx number when refilled electronically. Your pharmacy may indicate that no refills were authorized even though a new prescription for the same medication is available at the pharmacy. Please request the medicine by name with the pharmacy before contacting your provider for a refill.        Your To Do List     Future Labs/Procedures Complete By Expires    CT-CTA NECK WITH & W/O-POST PROCESSING  As directed 1/16/2018         MyChart Status: Patient Declined

## 2017-01-16 NOTE — Clinical Note
January 17, 2017        Rodolfo SOLIS III [facility]  [address]  [place] [address]        Dear Rodolfo SOLIS:    During your office visit in December a Urine Drug Screen was performed.  The results of the urine drug screen were abnormal showing positive marijuana which is an illegal substance according to federal regulations.  In light of these abnormalities and due to changes in prescribing policy and limitations placed by local, state and federal agencies, neither I, nor any other provider in the [facility] system will be able to fill your controlled substances. The current prescription will allow you time to find another provider to fill this medication, or I will be happy to refer you to pain management. The other option would be to use this prescription to wean yourself off of the medication.     Dr. De La Cruz will remain as your Primary Care Provider for your other medical problems that do not require a prescription for a controlled substance.    Thank you for your understanding in this matter.    If you have any questions or concerns, please don't hesitate to call.        Sincerely,        Dayton Duncan, SAUL.P.R.N.    Electronically Signed

## 2017-01-16 NOTE — DISCHARGE INSTRUCTIONS
Discharge Instructions    Discharged to home by car with relative. Discharged via wheelchair, hospital escort: Yes.  Special equipment needed: Not Applicable    Be sure to schedule a follow-up appointment with your primary care doctor or any specialists as instructed.     Discharge Plan:   Smoking Cessation Offered: Patient Counseled  Influenza Vaccine Indication: Patient Refuses    I understand that a diet low in cholesterol, fat, and sodium is recommended for good health. Unless I have been given specific instructions below for another diet, I accept this instruction as my diet prescription.   Other diet: Regular    Special Instructions: None    · Is patient discharged on Warfarin / Coumadin?   No     · Is patient Post Blood Transfusion?  No    Depression / Suicide Risk    As you are discharged from this [facility] facility, it is important to learn how to keep safe from harming yourself.    Recognize the warning signs:  · Abrupt changes in personality, positive or negative- including increase in energy   · Giving away possessions  · Change in eating patterns- significant weight changes-  positive or negative  · Change in sleeping patterns- unable to sleep or sleeping all the time   · Unwillingness or inability to communicate  · Depression  · Unusual sadness, discouragement and loneliness  · Talk of wanting to die  · Neglect of personal appearance   · Rebelliousness- reckless behavior  · Withdrawal from people/activities they love  · Confusion- inability to concentrate     If you or a loved one observes any of these behaviors or has concerns about self-harm, here's what you can do:  · Talk about it- your feelings and reasons for harming yourself  · Remove any means that you might use to hurt yourself (examples: pills, rope, extension cords, firearm)  · Get professional help from the community (Mental Health, Substance Abuse, psychological counseling)  · Do not be alone:Call your Safe Contact- someone whom you  trust who will be there for you.  · Call your local CRISIS HOTLINE 386-4423 or 542-827-0697  · Call your local Children's Mobile Crisis Response Team [place] (308) 883-7639 or www.[employer]  · Call the toll free National Suicide Prevention Hotlines   · National Suicide Prevention Lifeline 219-450-RBNQ (9705)  · National Hope Line Network 800-SUICIDE (050-2088)    Syncope, commonly known as fainting, is a temporary loss of consciousness. It occurs when the blood flow to the brain is reduced. Vasovagal syncope (also called neurocardiogenic syncope) is a fainting spell in which the blood flow to the brain is reduced because of a sudden drop in heart rate and blood pressure. Vasovagal syncope occurs when the brain and the cardiovascular system (blood vessels) do not adequately communicate and respond to each other. This is the most common cause of fainting. It often occurs in response to fear or some other type of emotional or physical stress. The body has a reaction in which the heart starts beating too slowly or the blood vessels expand, reducing blood pressure. This type of fainting spell is generally considered harmless. However, injuries can occur if a person takes a sudden fall during a fainting spell.   CAUSES   Vasovagal syncope occurs when a person's blood pressure and heart rate decrease suddenly, usually in response to a trigger. Many things and situations can trigger an episode. Some of these include:   · Pain.    · Fear.    · The sight of blood or medical procedures, such as blood being drawn from a vein.    · Common activities, such as coughing, swallowing, stretching, or going to the bathroom.    · Emotional stress.    · Prolonged standing, especially in a warm environment.    · Lack of sleep or rest.    · Prolonged lack of food.    · Prolonged lack of fluids.    · Recent illness.  · The use of certain drugs that affect blood pressure, such as cocaine, alcohol, marijuana, inhalants, and  opiates.    SYMPTOMS   Before the fainting episode, you may:   · Feel dizzy or light headed.    · Become pale.  · Sense that you are going to faint.    · Feel like the room is spinning.    · Have tunnel vision, only seeing directly in front of you.    · Feel sick to your stomach (nauseous).    · See spots or slowly lose vision.    · Hear ringing in your ears.    · Have a headache.    · Feel warm and sweaty.    · Feel a sensation of pins and needles.  During the fainting spell, you will generally be unconscious for no longer than a couple minutes before waking up and returning to normal. If you get up too quickly before your body can recover, you may faint again. Some twitching or jerky movements may occur during the fainting spell.   DIAGNOSIS   Your health care provider will ask about your symptoms, take a medical history, and perform a physical exam. Various tests may be done to rule out other causes of fainting. These may include blood tests and tests to check the heart, such as electrocardiography, echocardiography, and possibly an electrophysiology study. When other causes have been ruled out, a test may be done to check the body's response to changes in position (tilt table test).  TREATMENT   Most cases of vasovagal syncope do not require treatment. Your health care provider may recommend ways to avoid fainting triggers and may provide home strategies for preventing fainting. If you must be exposed to a possible trigger, you can drink additional fluids to help reduce your chances of having an episode of vasovagal syncope. If you have warning signs of an oncoming episode, you can respond by positioning yourself favorably (lying down).  If your fainting spells continue, you may be given medicines to prevent fainting. Some medicines may help make you more resistant to repeated episodes of vasovagal syncope. Special exercises or compression stockings may be recommended. In rare cases, the surgical placement of a  pacemaker is considered.  HOME CARE INSTRUCTIONS   · Learn to identify the warning signs of vasovagal syncope.    · Sit or lie down at the first warning sign of a fainting spell. If sitting, put your head down between your legs. If you lie down, swing your legs up in the air to increase blood flow to the brain.    · Avoid hot tubs and saunas.  · Avoid prolonged standing.  · Drink enough fluids to keep your urine clear or pale yellow. Avoid caffeine.  · Increase salt in your diet as directed by your health care provider.    · If you have to stand for a long time, perform movements such as:    · Crossing your legs.    · Flexing and stretching your leg muscles.    · Squatting.    · Moving your legs.    · Bending over.    · Only take over-the-counter or prescription medicines as directed by your health care provider. Do not suddenly stop any medicines without asking your health care provider first.   SEEK MEDICAL CARE IF:   · Your fainting spells continue or happen more frequently in spite of treatment.    · You lose consciousness for more than a couple minutes.  · You have fainting spells during or after exercising or after being startled.    · You have new symptoms that occur with the fainting spells, such as:    · Shortness of breath.  · Chest pain.    · Irregular heartbeat.    · You have episodes of twitching or jerky movements that last longer than a few seconds.  · You have episodes of twitching or jerky movements without obvious fainting.  SEEK IMMEDIATE MEDICAL CARE IF:   · You have injuries or bleeding after a fainting spell.    · You have episodes of twitching or jerky movements that last longer than 5 minutes.    · You have more than one spell of twitching or jerky movements before returning to consciousness after fainting.     This information is not intended to replace advice given to you by your health care provider. Make sure you discuss any questions you have with your health care provider.     Document  Released: 12/04/2013 Document Revised: 05/03/2016 Document Reviewed: 12/04/2013  [employer] Patient Education ©2016 [employer]    Sinusitis, Adult  Sinusitis is redness, soreness, and inflammation of the paranasal sinuses. Paranasal sinuses are air pockets within the bones of your face. They are located beneath your eyes, in the middle of your forehead, and above your eyes. In healthy paranasal sinuses, mucus is able to drain out, and air is able to circulate through them by way of your nose. However, when your paranasal sinuses are inflamed, mucus and air can become trapped. This can allow bacteria and other germs to grow and cause infection.  Sinusitis can develop quickly and last only a short time (acute) or continue over a long period (chronic). Sinusitis that lasts for more than 12 weeks is considered chronic.  CAUSES  Causes of sinusitis include:  · Allergies.  · Structural abnormalities, such as displacement of the cartilage that separates your nostrils (deviated septum), which can decrease the air flow through your nose and sinuses and affect sinus drainage.  · Functional abnormalities, such as when the small hairs (cilia) that line your sinuses and help remove mucus do not work properly or are not present.  SIGNS AND SYMPTOMS  Symptoms of acute and chronic sinusitis are the same. The primary symptoms are pain and pressure around the affected sinuses. Other symptoms include:  · Upper toothache.  · Earache.  · Headache.  · Bad breath.  · Decreased sense of smell and taste.  · A cough, which worsens when you are lying flat.  · Fatigue.  · Fever.  · Thick drainage from your nose, which often is green and may contain pus (purulent).  · Swelling and warmth over the affected sinuses.  DIAGNOSIS  Your health care provider will perform a physical exam. During your exam, your health care provider may perform any of the following to help determine if you have acute sinusitis or chronic sinusitis:  · Look  in your nose for signs of abnormal growths in your nostrils (nasal polyps).  · Tap over the affected sinus to check for signs of infection.  · View the inside of your sinuses using an imaging device that has a light attached (endoscope).  If your health care provider suspects that you have chronic sinusitis, one or more of the following tests may be recommended:  · Allergy tests.  · Nasal culture. A sample of mucus is taken from your nose, sent to a lab, and screened for bacteria.  · Nasal cytology. A sample of mucus is taken from your nose and examined by your health care provider to determine if your sinusitis is related to an allergy.  TREATMENT  Most cases of acute sinusitis are related to a viral infection and will resolve on their own within 10 days. Sometimes, medicines are prescribed to help relieve symptoms of both acute and chronic sinusitis. These may include pain medicines, decongestants, nasal steroid sprays, or saline sprays.  However, for sinusitis related to a bacterial infection, your health care provider will prescribe antibiotic medicines. These are medicines that will help kill the bacteria causing the infection.  Rarely, sinusitis is caused by a fungal infection. In these cases, your health care provider will prescribe antifungal medicine.  For some cases of chronic sinusitis, surgery is needed. Generally, these are cases in which sinusitis recurs more than 3 times per year, despite other treatments.  HOME CARE INSTRUCTIONS  · Drink plenty of water. Water helps thin the mucus so your sinuses can drain more easily.  · Use a humidifier.  · Inhale steam 3-4 times a day (for example, sit in the bathroom with the shower running).  · Apply a warm, moist washcloth to your face 3-4 times a day, or as directed by your health care provider.  · Use saline nasal sprays to help moisten and clean your sinuses.  · Take medicines only as directed by your health care provider.  · If you were prescribed either an  antibiotic or antifungal medicine, finish it all even if you start to feel better.  SEEK IMMEDIATE MEDICAL CARE IF:  · You have increasing pain or severe headaches.  · You have nausea, vomiting, or drowsiness.  · You have swelling around your face.  · You have vision problems.  · You have a stiff neck.  · You have difficulty breathing.     This information is not intended to replace advice given to you by your health care provider. Make sure you discuss any questions you have with your health care provider.     Document Released: 12/18/2006 Document Revised: 01/08/2016 Document Reviewed: 01/01/2013  [employer] Patient Education ©2016 [employer]

## 2017-01-17 PROBLEM — R89.2 ABNORMAL DRUG SCREEN: Status: ACTIVE | Noted: 2017-01-17

## 2017-01-17 NOTE — ASSESSMENT & PLAN NOTE
Chronic right ankle pain   Current pain control: good, 3-4/10  Adverse effects: Patient denies sedation, dizziness, constipation or other adverse effects with medication.  Physical functioning: fair  Mood: excellent  Family and social relationships: excellent  Sleep pattern: excellent  Overall functioning: good  Nonnarcotic treatments that are being used: ibuprofen, diclofenac gel,      Pain management agreement initiated and signed on: 12/16/2016  Last dose of narcotic medication: yesterday afternoon  Most recent urine drug screen done 12/16/2016, results Positive for Marijuana, otherwise as expected. 60 hydrocodone-acetaminophen 10/325mg provided to patient as a bridge while he finds another provider for pain management. Will call patient to further discuss options going forward related to drug screen positive for marijuana.  Aberrant Behaviors: None  I have reviewed the medical records, the Prescription Monitoring Program and I have determined that is medically indicated.    I have advised patient to keep medication in a safe place and to not drive with medication.

## 2017-01-17 NOTE — ASSESSMENT & PLAN NOTE
Incidental finding on carotid Doppler, recommended follow-up with CTA. CTA ordered at this time. According to Doppler results it is indeterminate whether this is a pseudoaneurysm or plaque.

## 2017-01-17 NOTE — ASSESSMENT & PLAN NOTE
Patient was seen in the emergency room 1/14. For an episode of syncope, dizziness was not consistent with vertigo. Patient states he has not had any further symptoms since that one episode. All lab work, EKG, and imaging noted to be within normal limits. Patient denies weakness, dizziness, palpitations, shortness of breath. Patient encouraged to follow up in the emergency room his symptoms return or worse. Patient denies anxiety related to this incident.

## 2017-01-17 NOTE — PROGRESS NOTES
Chief Complaint   Patient presents with   • Follow-Up     1 Month Follow Up        HISTORY OF PRESENT ILLNESS: Patient is a 61 y.o. male established patient who presents today to follow-up on hypertension and other issues.    Essential hypertension  Blood pressure today is 148/80, patient states he has not checked his blood pressure at home. Patient denies headache, blurry vision, palpitations, chest pain. States he has continued to take ibuprofen as needed. Discussed goal blood pressure of less than 140/90, discussed concern with chronically elevated uncontrolled blood pressure. Plan to start lisinopril 10 mg daily. Patient was counseled on possible side effects of medication including cough, nausea. Patient will report any concerning side effects or symptoms with the medication.    Cigarette nicotine dependence without complication  Patient is a current smoker. States that he has tried quitting several times. States he has been unsuccessful. Patient does not currently have desire to try to quit smoking.    Chronic pain of right ankle  Chronic right ankle pain   Current pain control: good, 3-4/10  Adverse effects: Patient denies sedation, dizziness, constipation or other adverse effects with medication.  Physical functioning: fair  Mood: excellent  Family and social relationships: excellent  Sleep pattern: excellent  Overall functioning: good  Nonnarcotic treatments that are being used: ibuprofen, diclofenac gel,      Pain management agreement initiated and signed on: 12/16/2016  Last dose of narcotic medication: yesterday afternoon  Most recent urine drug screen done 12/16/2016, results Positive for Marijuana, otherwise as expected. 60 hydrocodone-acetaminophen 10/325mg provided to patient as a bridge while he finds another provider for pain management. Will call patient to further discuss options going forward related to drug screen positive for marijuana.  Aberrant Behaviors: None  I have reviewed the medical  records, the Prescription Monitoring Program and I have determined that is medically indicated.    I have advised patient to keep medication in a safe place and to not drive with medication.      Syncope  Patient was seen in the emergency room 1/14. For an episode of syncope, dizziness was not consistent with vertigo. Patient states he has not had any further symptoms since that one episode. All lab work, EKG, and imaging noted to be within normal limits. Patient denies weakness, dizziness, palpitations, shortness of breath. Patient encouraged to follow up in the emergency room his symptoms return or worse. Patient denies anxiety related to this incident.    Internal carotid aneurysm  Incidental finding on carotid Doppler, recommended follow-up with CTA. CTA ordered at this time. According to Doppler results it is indeterminate whether this is a pseudoaneurysm or plaque.      Patient Active Problem List    Diagnosis Date Noted   • Internal carotid aneurysm 01/16/2017   • Syncope 01/14/2017   • Essential hypertension 12/16/2016   • Cigarette nicotine dependence without complication 12/16/2016   • Chronic pain of right ankle 12/16/2016   • Routine general medical examination at a health care facility 12/16/2016       Allergies:Review of patient's allergies indicates no known allergies.    Current Outpatient Prescriptions   Medication Sig Dispense Refill   • lisinopril (PRINIVIL) 10 MG Tab Take 1 Tab by mouth every day. 30 Tab 0   • hydrocodone/acetaminophen (NORCO)  MG Tab Take 1 Tab by mouth every 8 hours as needed. 60 Tab 0   • ibuprofen (MOTRIN) 800 MG Tab Take 800 mg by mouth every 8 hours as needed.     • Diclofenac Sodium 1 % Gel Apply 1 Application to affected area(s) 2 times a day as needed.       No current facility-administered medications for this visit.       Social History   Substance Use Topics   • Smoking status: Current Some Day Smoker -- 0.25 packs/day for 20 years     Types: Cigarettes   •  "Smokeless tobacco: Never Used   • Alcohol Use: No       No family status information on file.     Family History   Problem Relation Age of Onset   • Diabetes Father    • Heart Disease Neg Hx    • Cancer Neg Hx        Review of Systems:   Constitutional:  Negative for fever, chills, weight loss and malaise/fatigue.   HEENT:  Negative for ear pain, nosebleeds, congestion, sore throat and neck pain.    Eyes:  Negative for blurred vision.   Respiratory:  Negative for cough, sputum production, shortness of breath and wheezing.    Cardiovascular:  Negative for chest pain, palpitations, orthopnea and leg swelling.   Gastrointestinal:  Negative for heartburn, nausea, vomiting and abdominal pain.   Genitourinary:  Negative for dysuria, urgency and frequency.   Musculoskeletal:  Negative for myalgias, back pain and positive joint pain.   Skin:  Negative for rash and itching.   Neurological:  Negative for dizziness, tingling, tremors, sensory change, focal weakness and headaches.   Endo/Heme/Allergies:  Does not bruise/bleed easily.   Psychiatric/Behavioral:  Negative for depression, suicidal ideas and memory loss.  The patient is not nervous/anxious and does not have insomnia.    All other systems reviewed and are negative except as in HPI.    Exam:  Blood pressure 148/80, pulse 98, temperature 36.6 °C (97.9 °F), resp. rate 16, height 1.905 m (6' 3\"), weight 90.719 kg (200 lb), SpO2 94 %.  General:  Normal appearing. No distress.  HEENT:  Normocephalic. Eyes conjunctiva clear lids without ptosis, pupils equal and reactive to light accommodation, ears normal shape and contour, canals are clear bilaterally, tympanic membranes are benign, nasal mucosa benign, oropharynx is without erythema, edema or exudates.   Neck:  Supple without JVD or bruit. Thyroid is not enlarged.  Pulmonary:  Clear to ausculation.  Normal effort. No rales, ronchi, or wheezing.  Cardiovascular:  Regular rate and rhythm without murmur. Carotid and radial " pulses are intact and equal bilaterally.  Abdomen:  Soft, nontender, nondistended. Normal bowel sounds. Liver and spleen are not palpable  Neurologic:  Grossly nonfocal  Lymph:  No cervical, supraclavicular or axillary lymph nodes are palpable  Skin:  Warm and dry.  No obvious lesions.  Musculoskeletal:  Normal gait. No extremity cyanosis, clubbing, or edema. Significantly deformed right ankle noted, no tenderness to palpation, range of motion is significantly limited in the right ankle versus the left.  Patient reports pain with range of motion.   Psych:  Normal mood and affect. Alert and oriented x3. Judgment and insight is normal.      Medical decision-making and discussion: Rodolfo is a generally well-appearing 61-year-old male patient here today to follow-up regarding hypertension. Patient blood pressure remains elevated at 148/80 lisinopril 10 mg daily started at this time. Patient will follow-up in one month to discuss blood pressure medication as well as results from CTA to further assess internal carotid defect, will likely refer to vascular at that time. Patient continues to take Norco 10/325 mg up to twice daily, drug screen noted to be positive for marijuana, will call patient to further discuss drug screen results. Will encourage patient to accept referral to pain management, or make a plan to wean patient off this medication. ER precautions provided to patient at this time.     Please note that this dictation was created using voice recognition software. I have made every reasonable attempt to correct obvious errors, but I expect that there are errors of grammar and possibly content that I did not discover before finalizing the note.    Assessment/Plan:  1. Internal carotid aneurysm  CT-CTA NECK WITH & W/O-POST PROCESSING   2. Essential hypertension  lisinopril (PRINIVIL) 10 MG Tab   3. Chronic pain of right ankle     4. Cigarette nicotine dependence without complication     5. Syncope, unspecified  syncope type            I have placed the below orders and discussed them with an approved delegating provider. The MA is performing the below orders under the direction of Dr. Qureshi.

## 2017-01-17 NOTE — ASSESSMENT & PLAN NOTE
Blood pressure today is 148/80, patient states he has not checked his blood pressure at home. Patient denies headache, blurry vision, palpitations, chest pain. States he has continued to take ibuprofen as needed. Discussed goal blood pressure of less than 140/90, discussed concern with chronically elevated uncontrolled blood pressure. Plan to start lisinopril 10 mg daily. Patient was counseled on possible side effects of medication including cough, nausea. Patient will report any concerning side effects or symptoms with the medication.

## 2017-01-19 ENCOUNTER — TELEPHONE (OUTPATIENT)
Dept: MEDICAL GROUP | Facility: PHYSICIAN GROUP | Age: 62
End: 2017-01-19

## 2017-01-19 NOTE — TELEPHONE ENCOUNTER
Called and left patient a 2nd voicemail to give me a call back. Trying to get a hold of patient because [place] needs to talk about his drug test results. Going to call again and after that i will send a letter. LM

## 2017-02-13 ENCOUNTER — OFFICE VISIT (OUTPATIENT)
Dept: MEDICAL GROUP | Facility: PHYSICIAN GROUP | Age: 62
End: 2017-02-13
Payer: COMMERCIAL

## 2017-02-13 ENCOUNTER — HOSPITAL ENCOUNTER (OUTPATIENT)
Dept: LAB | Facility: MEDICAL CENTER | Age: 62
End: 2017-02-13
Attending: NURSE PRACTITIONER
Payer: COMMERCIAL

## 2017-02-13 VITALS
DIASTOLIC BLOOD PRESSURE: 78 MMHG | OXYGEN SATURATION: 96 % | BODY MASS INDEX: 25.49 KG/M2 | TEMPERATURE: 97.6 F | WEIGHT: 205 LBS | HEIGHT: 75 IN | SYSTOLIC BLOOD PRESSURE: 150 MMHG | HEART RATE: 96 BPM | RESPIRATION RATE: 16 BRPM

## 2017-02-13 DIAGNOSIS — M19.90 ARTHRITIS: ICD-10-CM

## 2017-02-13 DIAGNOSIS — I67.1 INTERNAL CAROTID ANEURYSM: ICD-10-CM

## 2017-02-13 DIAGNOSIS — I10 ESSENTIAL HYPERTENSION: ICD-10-CM

## 2017-02-13 DIAGNOSIS — G89.29 CHRONIC PAIN OF RIGHT ANKLE: ICD-10-CM

## 2017-02-13 DIAGNOSIS — R89.2 ABNORMAL DRUG SCREEN: ICD-10-CM

## 2017-02-13 DIAGNOSIS — M25.571 CHRONIC PAIN OF RIGHT ANKLE: ICD-10-CM

## 2017-02-13 DIAGNOSIS — M72.0 DUPUYTREN'S CONTRACTURE OF BOTH HANDS: ICD-10-CM

## 2017-02-13 LAB
CRP SERPL HS-MCNC: 0.06 MG/DL (ref 0–0.75)
ERYTHROCYTE [SEDIMENTATION RATE] IN BLOOD BY WESTERGREN METHOD: 15 MM/HOUR (ref 0–20)
RHEUMATOID FACT SERPL-ACNC: <10 IU/ML (ref 0–14)
URATE SERPL-MCNC: 5.8 MG/DL (ref 2.5–8.3)

## 2017-02-13 PROCEDURE — 99214 OFFICE O/P EST MOD 30 MIN: CPT | Performed by: NURSE PRACTITIONER

## 2017-02-13 PROCEDURE — 86038 ANTINUCLEAR ANTIBODIES: CPT

## 2017-02-13 PROCEDURE — 84550 ASSAY OF BLOOD/URIC ACID: CPT

## 2017-02-13 PROCEDURE — 36415 COLL VENOUS BLD VENIPUNCTURE: CPT

## 2017-02-13 PROCEDURE — 86060 ANTISTREPTOLYSIN O TITER: CPT

## 2017-02-13 PROCEDURE — 85652 RBC SED RATE AUTOMATED: CPT

## 2017-02-13 PROCEDURE — 86200 CCP ANTIBODY: CPT

## 2017-02-13 PROCEDURE — 84165 PROTEIN E-PHORESIS SERUM: CPT

## 2017-02-13 PROCEDURE — 84160 ASSAY OF PROTEIN ANY SOURCE: CPT

## 2017-02-13 PROCEDURE — 86431 RHEUMATOID FACTOR QUANT: CPT

## 2017-02-13 PROCEDURE — 86140 C-REACTIVE PROTEIN: CPT

## 2017-02-13 RX ORDER — LISINOPRIL 20 MG/1
20 TABLET ORAL DAILY
Qty: 30 TAB | Refills: 0 | Status: SHIPPED | OUTPATIENT
Start: 2017-02-13 | End: 2017-03-14 | Stop reason: SDUPTHER

## 2017-02-13 ASSESSMENT — PAIN SCALES - GENERAL: PAINLEVEL: 8=MODERATE-SEVERE PAIN

## 2017-02-13 NOTE — MR AVS SNAPSHOT
"        Rodolfo Saunders III   2017 7:00 AM   Office Visit   MRN: [id number]    Department:  [facility]   Dept Phone:  [phone]    Description:  Male : 1955   Provider:  RUFINA Manriquez           Reason for Visit     Follow-Up 1 Month Follow Up       Allergies as of 2017     No Known Allergies      You were diagnosed with     Essential hypertension   [9081255]       Arthritis   [263670]       Chronic pain of right ankle   [0106530]         Vital Signs     Blood Pressure Pulse Temperature Respirations Height Weight    150/78 mmHg 96 36.4 °C (97.6 °F) 16 1.905 m (6' 3\") 92.987 kg (205 lb)    Body Mass Index Oxygen Saturation Smoking Status             25.62 kg/m2 96% Current Some Day Smoker         Basic Information     Date Of Birth Sex Race Ethnicity Preferred Language    1955 Male White Non- English      Your appointments     Mar 07, 2017 11:20 AM   Established Patient with Deandre De La Cruz M.D.   [facility] (--)    [address]  [address]  [facility] [address] [phone]           You will be receiving a confirmation call a few days before your appointment from our automated call confirmation system.              Problem List              ICD-10-CM Priority Class Noted - Resolved    Essential hypertension I10   2016 - Present    Cigarette nicotine dependence without complication F17.210   2016 - Present    Chronic pain of right ankle M25.571, G89.29   2016 - Present    Routine general medical examination at a health care facility Z00.00   2016 - Present    Syncope R55   2017 - Present    Internal carotid aneurysm I67.1   2017 - Present    Abnormal drug screen R89.2   2017 - Present      Health Maintenance        Date Due Completion Dates    IMM PNEUMOCOCCAL 19-64 (ADULT) MEDIUM RISK SERIES (1 of 1 - PPSV23) 1976 ---    COLONOSCOPY 2007 ---    IMM INFLUENZA (1) 2018 (Originally 2016) " ---    IMM DTaP/Tdap/Td Vaccine (2 - Td) 1/16/2024 1/16/2014            Current Immunizations     Tdap Vaccine 1/16/2014      Below and/or attached are the medications your provider expects you to take. Review all of your home medications and newly ordered medications with your provider and/or pharmacist. Follow medication instructions as directed by your provider and/or pharmacist. Please keep your medication list with you and share with your provider. Update the information when medications are discontinued, doses are changed, or new medications (including over-the-counter products) are added; and carry medication information at all times in the event of emergency situations     Allergies:  No Known Allergies          Medications  Valid as of: February 13, 2017 -  7:42 AM    Generic Name Brand Name Tablet Size Instructions for use    Diclofenac Sodium (Gel) Diclofenac Sodium 1 % Apply 1 Application to affected area(s) 2 times a day as needed.        Hydrocodone-Acetaminophen (Tab) NORCO  MG Take 1 Tab by mouth every 8 hours as needed.        Ibuprofen (Tab) MOTRIN 800 MG Take 800 mg by mouth every 8 hours as needed.        Lisinopril (Tab) PRINIVIL 20 MG Take 1 Tab by mouth every day.        .                 Medicines prescribed today were sent to:     [facility]/PHARMACY #[id number] - [place] - [address] [place] [address]    Phone: [phone] Fax: [phone]    Open 24 Hours?: No      Medication refill instructions:       If your prescription bottle indicates you have medication refills left, it is not necessary to call your provider’s office. Please contact your pharmacy and they will refill your medication.    If your prescription bottle indicates you do not have any refills left, you may request refills at any time through one of the following ways: The online M/A-COM system (except Urgent Care), by calling your provider’s office, or by asking your pharmacy to contact your provider’s office with  a refill request. Medication refills are processed only during regular business hours and may not be available until the next business day. Your provider may request additional information or to have a follow-up visit with you prior to refilling your medication.   *Please Note: Medication refills are assigned a new Rx number when refilled electronically. Your pharmacy may indicate that no refills were authorized even though a new prescription for the same medication is available at the pharmacy. Please request the medicine by name with the pharmacy before contacting your provider for a refill.        Your To Do List     Future Labs/Procedures Complete By Expires    CCP ANTIBODY  As directed 2/13/2018    RHEUMATOID ARTHRITIS FACTOR  As directed 2/13/2018    SERUM PROTEIN ELECTROPHORESIS  As directed 2/13/2018    WESTERGREN SED RATE  As directed 2/13/2018         MyChart Status: Patient Declined

## 2017-02-13 NOTE — ASSESSMENT & PLAN NOTE
"This is a new problem patient states he has painful \"bumps\" on  his bilateral hands. He notes they are located below his 4th finger on each hand. States that he has tried taping his fingers together because he noticed they were curling closed. Patient states he has not noticed anything specific that makes them better or worse. States he is having difficulty with manual dexterity. States he thought this was related to his arthritis. 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  "

## 2017-02-13 NOTE — ASSESSMENT & PLAN NOTE
This is a new problem. States he was told he had osteoarthritis multiple years ago, and that he has had chronic joint pain. States that recently he has noticed difficulty closing his hands completely, states hands are swollen and stiff. Patient rates his pain 5/10 states the swelling has increased over the last month or 2.  Does have some numbness right pointer finger. Denies numbness or tingling in the rest of his upper extremity. Patient is a manual worker.

## 2017-02-13 NOTE — ASSESSMENT & PLAN NOTE
Chronic in nature. Stable. Patient is referred to pain management at this time related to positive drug screen for marijuana. Two years ago he fell from two stories. He has had multiple surgeries including one on his right ankle and a fusion of his tibia. Continues to have impaired range of motion and severe pain if he walks greater than a mile. He is following with ortho. They have ordered imaging, bone scan. He is possible going for revised surgery. No Norco was provided today, diclofenac gel is reordered at this time. Patient continues to use ibuprofen as needed for pain.

## 2017-02-13 NOTE — ASSESSMENT & PLAN NOTE
Blood pressure is elevated today at 150/70. Patient was taking lisinopril 10 mg daily, lisinopril dose is increased to 20 mg daily with regard to continued elevated blood pressure. Patient denies side effects on lisinopril. Patient denies chest pain, palpitations, dizziness, shortness of breath, headache or blurry vision.

## 2017-02-13 NOTE — PROGRESS NOTES
Chief Complaint   Patient presents with   • Follow-Up     1 Month Follow Up        HISTORY OF PRESENT ILLNESS: Patient is a 61 y.o. male established patient who presents today to discuss chronic pain and hypertension.    Abnormal drug screen  Discussed positive drug screen for marijuana. Patient is amenable to referral to pain management. Patient states that he does not smoke marijuana or use edibles but does use hemp oil for his hands.     Chronic pain of right ankle  Chronic in nature. Stable. Patient is referred to pain management at this time related to positive drug screen for marijuana. Two years ago he fell from two stories. He has had multiple surgeries including one on his right ankle and a fusion of his tibia. Continues to have impaired range of motion and severe pain if he walks greater than a mile. He is following with ortho. They have ordered imaging, bone scan. He is possible going for revised surgery. No Norco was provided today, diclofenac gel is reordered at this time. Patient continues to use ibuprofen as needed for pain.    Essential hypertension  Blood pressure is elevated today at 150/70. Patient was taking lisinopril 10 mg daily, lisinopril dose is increased to 20 mg daily with regard to continued elevated blood pressure. Patient denies side effects on lisinopril. Patient denies chest pain, palpitations, dizziness, shortness of breath, headache or blurry vision.    Internal carotid aneurysm  Patient has not completed follow-up CTA, patient will complete this scan prior to follow-up.    Arthritis  This is a new problem. States he was told he had osteoarthritis multiple years ago, and that he has had chronic joint pain. States that recently he has noticed difficulty closing his hands completely, states hands are swollen and stiff. Patient rates his pain 5/10 states the swelling has increased over the last month or 2.  Does have some numbness right pointer finger. Denies numbness or tingling in  "the rest of his upper extremity. Patient is a manual worker.     Dupuytren's contracture of both hands  This is a new problem patient states he has painful \"bumps\" on  his bilateral hands. He notes they are located below his 4th finger on each hand. States that he has tried taping his fingers together because he noticed they were curling closed. Patient states he has not noticed anything specific that makes them better or worse. States he is having difficulty with manual dexterity. States he thought this was related to his arthritis. 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        Patient Active Problem List    Diagnosis Date Noted   • Arthritis 02/13/2017   • Dupuytren's contracture of both hands 02/13/2017   • Abnormal drug screen 01/17/2017   • Internal carotid aneurysm 01/16/2017   • Syncope 01/14/2017   • Essential hypertension 12/16/2016   • Cigarette nicotine dependence without complication 12/16/2016   • Chronic pain of right ankle 12/16/2016   • Routine general medical examination at a health care facility 12/16/2016       Allergies:Review of patient's allergies indicates no known allergies.    Current Outpatient Prescriptions   Medication Sig Dispense Refill   • lisinopril (PRINIVIL) 20 MG Tab Take 1 Tab by mouth every day. 30 Tab 0   • Diclofenac Sodium 1 % Gel Apply 1 Application to affected area(s) 2 times a day as needed. 1 Tube 4   • hydrocodone/acetaminophen (NORCO)  MG Tab Take 1 Tab by mouth every 8 hours as needed. 60 Tab 0   • ibuprofen (MOTRIN) 800 MG Tab Take 800 mg by mouth every 8 hours as needed.       No current facility-administered medications for this visit.       Social History   Substance Use Topics   • Smoking status: Current Some Day Smoker -- 0.25 packs/day for 20 years     Types: Cigarettes   • Smokeless tobacco: Never Used   • Alcohol Use: No       No family status information on file.     Family History   Problem Relation Age " "of Onset   • Diabetes Father    • Heart Disease Neg Hx    • Cancer Neg Hx        Review of Systems:   Constitutional:  Negative for fever, chills, weight loss and malaise/fatigue.   HEENT:  Negative for ear pain, nosebleeds, congestion, sore throat and neck pain.    Eyes:  Negative for blurred vision.   Respiratory:  Negative for cough, sputum production, shortness of breath and wheezing.    Cardiovascular:  Negative for chest pain, palpitations, orthopnea and leg swelling.   Gastrointestinal:  Negative for heartburn, nausea, vomiting and abdominal pain.   Genitourinary:  Negative for dysuria, urgency and frequency.   Musculoskeletal:  Positive for myalgias, back pain and joint pain.   Skin:  Negative for rash and itching.   Neurological:  Negative for dizziness, tingling, tremors, sensory change, focal weakness and headaches.   Endo/Heme/Allergies:  Does not bruise/bleed easily.   Psychiatric/Behavioral:  Negative for depression, suicidal ideas and memory loss.  The patient is not nervous/anxious and does not have insomnia.    All other systems reviewed and are negative except as in HPI.    Exam:  Blood pressure 150/78, pulse 96, temperature 36.4 °C (97.6 °F), resp. rate 16, height 1.905 m (6' 3\"), weight 92.987 kg (205 lb), SpO2 96 %.  General:  Normal appearing. No distress.  HEENT:  Normocephalic. Eyes conjunctiva clear lids without ptosis, pupils equal and reactive to light accommodation, ears normal shape and contour.  Pulmonary:  Clear to ausculation.  Normal effort. No rales, ronchi, or wheezing.  Cardiovascular:  Regular rate and rhythm without murmur. Carotid and radial pulses are intact and equal bilaterally.  Neurologic:  Grossly nonfocal. Sharp/dull differentiation impaired right 4th and 5th finger.  Skin:  Warm and dry.  No obvious lesions.  Musculoskeletal:  Normal gait. No extremity cyanosis, clubbing, or edema. Swelling of bilateral hands noted with reduced finger ROM, patient is unable to close " hands. Positive edema in MCP, DIP, and PIP joints bilaterally, boggy and warm to palpation. Hard round nodules noted bilateral hands below the 4th fingers. Pain noted to palpation. Hands are significantly more swollen than previous exam. Significantly deformed right ankle noted, no tenderness to palpation, range of motion is significantly limited unable to invert or soledad ankle, dorsiflexion and plantar flexion are also minimal.  Patient reports pain with range of motion.    Psych: Normal mood and affect. Alert and oriented x3. Judgment and insight is normal.      Medical decision-making and discussion: Rodolfo is a generally well-appearing 61-year-old male patient here today to follow-up on hypertension blood pressure continues to be elevated at 150/78. Plan today is to increase lisinopril dose to 20 mg daily patient is amenable to this plan. Diclofenac gel is refilled at this time for ankle pain. Discussed with patient this provider's inability to provide refill on [place] 10/325 mg r/t marijuana. tablets patient is amenable to referral to pain management at this time. With regard to arthritis rheumatoid factor, CCP, autoimmune panel, ESR are ordered related to a suspected rheumatoid arthritis, patient has evidence of synovitis and significant painful inflammatory of more than 3 joints, patient also has severe stiffness. Patient states he will have CTA completed prior to follow-up. SPEP ordered r/t elevated globulin. Patient will follow-up with Dr. De La Cruz in 1 month. ED precautions: seek emergency evaluation for symptoms including but not limited to : crushing chest pain, chest pain associated with difficulty breathing, nausea, or sweats, heart rate irregular or too fast to count.       Please note that this dictation was created using voice recognition software. I have made every reasonable attempt to correct obvious errors, but I expect that there are errors of grammar and possibly content that I did not discover before  finalizing the note.    Assessment/Plan:  1. Essential hypertension     2. Arthritis  RHEUMATOID ARTHRITIS FACTOR    CCP ANTIBODY    URIC A+VALENTINO+RA QN+CRP+ASO    WESTERGREN SED RATE    SERUM PROTEIN ELECTROPHORESIS   3. Chronic pain of right ankle  REFERRAL TO PAIN MANAGEMENT   4. Abnormal drug screen     5. Internal carotid aneurysm     6. Dupuytren's contracture of both hands            I have placed the below orders and discussed them with an approved delegating provider. The MA is performing the below orders under the direction of .

## 2017-02-13 NOTE — ASSESSMENT & PLAN NOTE
Discussed positive drug screen for marijuana. Patient is amenable to referral to pain management. Patient states that he does not smoke marijuana or use edibles but does use hemp oil for his hands.

## 2017-02-14 DIAGNOSIS — M19.90 ARTHRITIS: ICD-10-CM

## 2017-02-15 ENCOUNTER — TELEPHONE (OUTPATIENT)
Dept: MEDICAL GROUP | Facility: PHYSICIAN GROUP | Age: 62
End: 2017-02-15

## 2017-02-15 LAB
ASO AB SERPL-ACNC: 440 IU/ML (ref 0–330)
CCP IGG SERPL-ACNC: 4 UNITS (ref 0–19)
NUCLEAR IGG SER QL IA: NORMAL

## 2017-02-15 NOTE — TELEPHONE ENCOUNTER
1. Caller Name: Rodolfo Saunders                                         Call Back Number: [phone] (home)     2. Message: Patient has been notified of the results.     3. Patient approves office to leave a detailed voicemail/MyChart message: N\A

## 2017-02-15 NOTE — TELEPHONE ENCOUNTER
----- Message from RUFINA Manriquez sent at 2/14/2017  6:31 PM PST -----  Please call pt and give lab results: All labs to look for autoimmune causes of hand swelling is within normal limits, will order x-ray of bilateral hands.

## 2017-02-18 LAB
ALBUMIN SERPL-MCNC: 4.91 G/DL (ref 3.75–5.01)
ALPHA1 GLOB SERPL ELPH-MCNC: 0.24 G/DL (ref 0.19–0.46)
ALPHA2 GLOB SERPL ELPH-MCNC: 0.71 G/DL (ref 0.48–1.05)
B-GLOBULIN SERPL ELPH-MCNC: 1.17 G/DL (ref 0.48–1.1)
EER PROT ELECT SER Q1092: ABNORMAL
GAMMA GLOB SERPL ELPH-MCNC: 0.87 G/DL (ref 0.62–1.51)
INTERPRETATION SERPL IFE-IMP: ABNORMAL
PROT SERPL-MCNC: 7.9 G/DL (ref 6–8.3)

## 2017-02-21 ENCOUNTER — TELEPHONE (OUTPATIENT)
Dept: MEDICAL GROUP | Facility: PHYSICIAN GROUP | Age: 62
End: 2017-02-21

## 2017-02-21 NOTE — TELEPHONE ENCOUNTER
1. Caller Name: Rodolfo Saunders                                         Call Back Number: [phone] (home)     2. Message: Patient notified of results below.     3. Patient approves office to leave a detailed voicemail/MyChart message: N\A

## 2017-02-21 NOTE — TELEPHONE ENCOUNTER
----- Message from RUFINA Manriquez sent at 2/21/2017  8:01 AM PST -----  Please call pt and give lab results: SPEP is within normal limits we will continue to monitor CMP regularly.

## 2017-03-07 ENCOUNTER — OFFICE VISIT (OUTPATIENT)
Dept: MEDICAL GROUP | Facility: PHYSICIAN GROUP | Age: 62
End: 2017-03-07
Payer: COMMERCIAL

## 2017-03-07 VITALS
HEIGHT: 75 IN | DIASTOLIC BLOOD PRESSURE: 60 MMHG | WEIGHT: 207.23 LBS | SYSTOLIC BLOOD PRESSURE: 130 MMHG | OXYGEN SATURATION: 95 % | HEART RATE: 84 BPM | BODY MASS INDEX: 25.77 KG/M2 | TEMPERATURE: 97.4 F

## 2017-03-07 DIAGNOSIS — M19.90 ARTHRITIS: ICD-10-CM

## 2017-03-07 DIAGNOSIS — Z79.891 CHRONIC USE OF OPIATE DRUGS THERAPEUTIC PURPOSES: ICD-10-CM

## 2017-03-07 DIAGNOSIS — M72.0 DUPUYTREN'S CONTRACTURE OF BOTH HANDS: ICD-10-CM

## 2017-03-07 DIAGNOSIS — I10 ESSENTIAL HYPERTENSION: ICD-10-CM

## 2017-03-07 PROCEDURE — 99214 OFFICE O/P EST MOD 30 MIN: CPT | Performed by: INTERNAL MEDICINE

## 2017-03-07 RX ORDER — IBUPROFEN 800 MG/1
800 TABLET ORAL EVERY 8 HOURS PRN
Qty: 60 TAB | Refills: 3 | Status: SHIPPED | OUTPATIENT
Start: 2017-03-07

## 2017-03-07 NOTE — MR AVS SNAPSHOT
"        Rodolfo WILL Liorhiginioberyl III   3/7/2017 11:20 AM   Office Visit   MRN: [id number]    Department:  [facility]   Dept Phone:  [phone]    Description:  Male : 1955   Provider:  Deandre De La Cruz M.D.           Reason for Visit     Follow-Up labs    Hand Swelling           Allergies as of 3/7/2017     No Known Allergies      You were diagnosed with     Essential hypertension   [9166241]       Dupuytren's contracture of both hands   [726332]       Arthritis   [666011]         Vital Signs     Blood Pressure Pulse Temperature Height Weight Body Mass Index    130/60 mmHg 84 36.3 °C (97.4 °F) 1.905 m (6' 3\") 94 kg (207 lb 3.7 oz) 25.90 kg/m2    Oxygen Saturation Smoking Status                95% Current Some Day Smoker          Basic Information     Date Of Birth Sex Race Ethnicity Preferred Language    1955 Male White Non- English      Problem List              ICD-10-CM Priority Class Noted - Resolved    Essential hypertension I10   2016 - Present    Cigarette nicotine dependence without complication F17.210   2016 - Present    Chronic pain of right ankle M25.571, G89.29   2016 - Present    Routine general medical examination at a health care facility Z00.00   2016 - Present    Syncope R55   2017 - Present    Internal carotid aneurysm I67.1   2017 - Present    Abnormal drug screen R89.2   2017 - Present    Arthritis M19.90   2017 - Present    Dupuytren's contracture of both hands M72.0   2017 - Present      Health Maintenance        Date Due Completion Dates    IMM PNEUMOCOCCAL 19-64 (ADULT) MEDIUM RISK SERIES (1 of 1 - PPSV23) 1976 ---    COLONOSCOPY 2007 ---    IMM INFLUENZA (1) 2018 (Originally 2016) ---    IMM DTaP/Tdap/Td Vaccine (2 - Td) 2024            Current Immunizations     Tdap Vaccine 2014      Below and/or attached are the medications your provider expects you to take. Review all of your home " medications and newly ordered medications with your provider and/or pharmacist. Follow medication instructions as directed by your provider and/or pharmacist. Please keep your medication list with you and share with your provider. Update the information when medications are discontinued, doses are changed, or new medications (including over-the-counter products) are added; and carry medication information at all times in the event of emergency situations     Allergies:  No Known Allergies          Medications  Valid as of: March 07, 2017 - 12:34 PM    Generic Name Brand Name Tablet Size Instructions for use    Diclofenac Sodium (Gel) Diclofenac Sodium 1 % Apply 1 Application to affected area(s) 2 times a day as needed.        Hydrocodone-Acetaminophen (Tab) NORCO  MG Take 1 Tab by mouth every 8 hours as needed.        Ibuprofen (Tab) MOTRIN 800 MG Take 1 Tab by mouth every 8 hours as needed.        Lisinopril (Tab) PRINIVIL 20 MG Take 1 Tab by mouth every day.        .                 Medicines prescribed today were sent to:     [facility]/PHARMACY #[id number] - [place] - [address]    [address] [place] [address]    Phone: [phone] Fax: [phone]    Open 24 Hours?: No      Medication refill instructions:       If your prescription bottle indicates you have medication refills left, it is not necessary to call your provider’s office. Please contact your pharmacy and they will refill your medication.    If your prescription bottle indicates you do not have any refills left, you may request refills at any time through one of the following ways: The online Sparrow system (except Urgent Care), by calling your provider’s office, or by asking your pharmacy to contact your provider’s office with a refill request. Medication refills are processed only during regular business hours and may not be available until the next business day. Your provider may request additional information or to have a follow-up visit with you  prior to refilling your medication.   *Please Note: Medication refills are assigned a new Rx number when refilled electronically. Your pharmacy may indicate that no refills were authorized even though a new prescription for the same medication is available at the pharmacy. Please request the medicine by name with the pharmacy before contacting your provider for a refill.           Cloud Theory Access Code: [email or web address]  Expires: 4/6/2017 11:26 AM    Cloud Theory  A secure, online tool to manage your health information     [employer]’s Cloud Theory® is a secure, online tool that connects you to your personalized health information from the privacy of your home -- day or night - making it very easy for you to manage your healthcare. Once the activation process is completed, you can even access your medical information using the Cloud Theory miquel, which is available for free in the Apple Miquel store or Google Play store.     Cloud Theory provides the following levels of access (as shown below):   My Chart Features   [facility] Primary Care Doctor [facility]  Specialists [facility]  Urgent  Care Non-[facility]  Primary Care  Doctor   Email your healthcare team securely and privately 24/7 X X X    Manage appointments: schedule your next appointment; view details of past/upcoming appointments X      Request prescription refills. X      View recent personal medical records, including lab and immunizations X X X X   View health record, including health history, allergies, medications X X X X   Read reports about your outpatient visits, procedures, consult and ER notes X X X X   See your discharge summary, which is a recap of your hospital and/or ER visit that includes your diagnosis, lab results, and care plan. X X       How to register for Cloud Theory:  1. Go to  [email or web address].  2. Click on the Sign Up Now box, which takes you to the New Member Sign Up page. You will need to provide the following information:  a. Enter your Cloud Theory Access Code  exactly as it appears at the top of this page. (You will not need to use this code after you’ve completed the sign-up process. If you do not sign up before the expiration date, you must request a new code.)   b. Enter your date of birth.   c. Enter your home email address.   d. Click Submit, and follow the next screen’s instructions.  3. Create a BitWall ID. This will be your BitWall login ID and cannot be changed, so think of one that is secure and easy to remember.  4. Create a BitWall password. You can change your password at any time.  5. Enter your Password Reset Question and Answer. This can be used at a later time if you forget your password.   6. Enter your e-mail address. This allows you to receive e-mail notifications when new information is available in BitWall.  7. Click Sign Up. You can now view your health information.    For assistance activating your BitWall account, call [phone]

## 2017-03-14 RX ORDER — LISINOPRIL 20 MG/1
TABLET ORAL
Qty: 90 TAB | Refills: 3 | Status: SHIPPED | OUTPATIENT
Start: 2017-03-14

## 2017-03-14 NOTE — PROGRESS NOTES
Subjective:   Rodolfo Saunders is a 61 y.o. male here today for hypertension, hand pain    Essential hypertension  Better controled on lisinopril. Around 130/80 at home. Denies any chest pain, legs swelling, chest pain, palpitations, blurry vision     Arthritis  He has pain on both hands, problem locking syndrome. Tenderness more on mid plantar area when palpation,, possible trigger point. Lab work negative for RA. On pain meds, triggers point injections with pain specialist or ortho    Dupuytren's contracture of both hands  Likely due to hand work. ?? Tendinitis     Chronic use of opiate drugs therapeutic purposes  Follows with pain specialist          Current medicines (including changes today)  Current Outpatient Prescriptions   Medication Sig Dispense Refill   • ibuprofen (MOTRIN) 800 MG Tab Take 1 Tab by mouth every 8 hours as needed. 60 Tab 3   • lisinopril (PRINIVIL) 20 MG Tab Take 1 Tab by mouth every day. 30 Tab 0   • Diclofenac Sodium 1 % Gel Apply 1 Application to affected area(s) 2 times a day as needed. 1 Tube 4   • hydrocodone/acetaminophen (NORCO)  MG Tab Take 1 Tab by mouth every 8 hours as needed. 60 Tab 0     No current facility-administered medications for this visit.     He  has no past medical history on file.    Current Outpatient Prescriptions   Medication Sig Dispense Refill   • ibuprofen (MOTRIN) 800 MG Tab Take 1 Tab by mouth every 8 hours as needed. 60 Tab 3   • lisinopril (PRINIVIL) 20 MG Tab Take 1 Tab by mouth every day. 30 Tab 0   • Diclofenac Sodium 1 % Gel Apply 1 Application to affected area(s) 2 times a day as needed. 1 Tube 4   • hydrocodone/acetaminophen (NORCO)  MG Tab Take 1 Tab by mouth every 8 hours as needed. 60 Tab 0     No current facility-administered medications for this visit.       Allergies as of 03/07/2017   • (No Known Allergies)       Social History     Social History   • Marital Status:      Spouse Name: N/A   • Number of Children: N/A   •  "Years of Education: N/A     Occupational History   • Not on file.     Social History Main Topics   • Smoking status: Current Some Day Smoker -- 0.25 packs/day for 20 years     Types: Cigarettes   • Smokeless tobacco: Never Used   • Alcohol Use: No   • Drug Use: No   • Sexual Activity:     Partners: Female     Other Topics Concern   • Not on file     Social History Narrative        Family History   Problem Relation Age of Onset   • Diabetes Father    • Heart Disease Neg Hx    • Cancer Neg Hx        Past Surgical History   Procedure Laterality Date   • Other orthopedic surgery Right      right ankle surgery   • Sinuscopy     • Hernia repair Right      inguinal hernia       ROS   No chest pain, no shortness of breath, no abdominal pain       Objective:     Blood pressure 130/60, pulse 84, temperature 36.3 °C (97.4 °F), height 1.905 m (6' 3\"), weight 94 kg (207 lb 3.7 oz), SpO2 95 %. Body mass index is 25.9 kg/(m^2).  Physical Exam:  Constitutional: Alert, no distress.  Skin: Warm, dry, good turgor, no rashes in visible areas.  Eye: Equal, round and reactive, conjunctiva clear, lids normal.  ENMT: Lips without lesions, poor dentition, oropharynx clear.  Neck: Trachea midline, no masses, no thyromegaly. No cervical or supraclavicular lymphadenopathy  Respiratory: Unlabored respiratory effort, lungs clear to auscultation, no wheezes, no ronchi.  Cardiovascular: Normal S1, S2, no murmur, no edema.  Abdomen: Soft, non-tender, no masses, no hepatosplenomegaly.  Psych: Alert and oriented x3, normal affect and mood.  Wrist/Hand: No deformity, swelling or bruising noted. Tenderness to palpation between third and  tenden. No tenderness at snuffbox. Range of motion intact. Strength and sensation intact.  Good  strength. 2+ radial pulse. Finkelstein's test: negative.      Assessment and Plan:   The following treatment plan was discussed    1. Essential hypertension  Well controled  On lisinopril. Continue same " treatment    2. Dupuytren's contracture of both hands  Injections by pain specialist or ortho    3. Arthritis  Oa, no signs of ra    4. Chronic use of opiate drugs therapeutic purposes  Managed by pain specialist       Followup: Return in about 6 months (around 9/7/2017).

## 2017-03-14 NOTE — ASSESSMENT & PLAN NOTE
Better controled on lisinopril. Around 130/80 at home. Denies any chest pain, legs swelling, chest pain, palpitations, blurry vision

## 2017-03-14 NOTE — ASSESSMENT & PLAN NOTE
He has pain on both hands, problem locking syndrome. Tenderness more on mid plantar area when palpation,, possible trigger point. Lab work negative for RA. On pain meds, triggers point injections with pain specialist or ortho

## 2017-09-07 ENCOUNTER — HOSPITAL ENCOUNTER (OUTPATIENT)
Dept: RADIOLOGY | Facility: MEDICAL CENTER | Age: 62
End: 2017-09-07
Attending: NURSE PRACTITIONER
Payer: COMMERCIAL

## 2017-09-07 DIAGNOSIS — G56.02 CARPAL TUNNEL SYNDROME ON LEFT: ICD-10-CM

## 2017-09-07 DIAGNOSIS — G56.01 CARPAL TUNNEL SYNDROME ON RIGHT: ICD-10-CM

## 2017-09-07 PROCEDURE — 73130 X-RAY EXAM OF HAND: CPT | Mod: LT

## 2017-09-11 ENCOUNTER — TELEPHONE (OUTPATIENT)
Dept: MEDICAL GROUP | Facility: PHYSICIAN GROUP | Age: 62
End: 2017-09-11

## 2017-09-11 NOTE — TELEPHONE ENCOUNTER
ESTABLISHED PATIENT PRE-VISIT PLANNING     Note: Patient will not be contacted if there is no indication to call.     1.  Reviewed notes from the last few office visits within the medical group: Yes    2.  If any orders were placed at last visit or intended to be done for this visit (i.e. 6 mos follow-up), do we have Results/Consult Notes?        •  Labs - Labs were not ordered at last office visit.       •  Imaging - Imaging was not ordered at last office visit.       •  Referrals - No referrals were ordered at last office visit.    3. Is this appointment scheduled as a Hospital Follow-Up? No    4.  Immunizations were updated in Epic using WebIZ?: Epic matches WebIZ       •  Web Iz Recommendations: FLU, MMR , TD and VARICELLA (Chicken Pox)     5.  Patient is due for the following Health Maintenance Topics:   Health Maintenance Due   Topic Date Due   • IMM PNEUMOCOCCAL 19-64 (ADULT) MEDIUM RISK SERIES (1 of 1 - PPSV23) 12/29/1976   • COLONOSCOPY  12/29/2007       - Patient has completed TDAP Immunization(s) per WebIZ. Chart has been updated.      6.  Patient was NOT informed to arrive 15 min prior to their scheduled appointment and bring in their medication bottles.

## 2017-09-23 ENCOUNTER — OFFICE VISIT (OUTPATIENT)
Dept: URGENT CARE | Facility: CLINIC | Age: 62
End: 2017-09-23
Payer: COMMERCIAL

## 2017-09-23 VITALS
HEIGHT: 75 IN | DIASTOLIC BLOOD PRESSURE: 86 MMHG | TEMPERATURE: 97.6 F | SYSTOLIC BLOOD PRESSURE: 140 MMHG | HEART RATE: 88 BPM | WEIGHT: 220 LBS | RESPIRATION RATE: 16 BRPM | BODY MASS INDEX: 27.35 KG/M2 | OXYGEN SATURATION: 95 %

## 2017-09-23 DIAGNOSIS — J01.80 OTHER ACUTE SINUSITIS: ICD-10-CM

## 2017-09-23 DIAGNOSIS — J40 BRONCHITIS: ICD-10-CM

## 2017-09-23 PROCEDURE — 99203 OFFICE O/P NEW LOW 30 MIN: CPT | Performed by: PHYSICIAN ASSISTANT

## 2017-09-23 RX ORDER — CEFUROXIME AXETIL 500 MG/1
500 TABLET ORAL 2 TIMES DAILY
Qty: 10 TAB | Refills: 0 | Status: SHIPPED | OUTPATIENT
Start: 2017-09-23 | End: 2017-09-28

## 2017-09-23 RX ORDER — PROMETHAZINE HYDROCHLORIDE AND CODEINE PHOSPHATE 6.25; 1 MG/5ML; MG/5ML
5-10 SYRUP ORAL 3 TIMES DAILY PRN
Qty: 150 ML | Refills: 0 | Status: SHIPPED | OUTPATIENT
Start: 2017-09-23 | End: 2020-01-24

## 2017-09-23 ASSESSMENT — ENCOUNTER SYMPTOMS
COUGH: 1
SORE THROAT: 0
CARDIOVASCULAR NEGATIVE: 1
SPUTUM PRODUCTION: 1
FEVER: 0
EYES NEGATIVE: 1
SHORTNESS OF BREATH: 0
CONSTITUTIONAL NEGATIVE: 1

## 2017-09-23 NOTE — PROGRESS NOTES
"Subjective:      Rodolfo Saunders is a 61 y.o. male who presents with Cough (x 5 days, productive cough, ribs hurts from coughing, chest congestion and wheezing) and Sinus Problem (x 5 days, nasal congestion, stuffy nose, post nasal drip)            Cough   This is a new problem. The current episode started in the past 7 days. The problem has been unchanged. The problem occurs every few minutes. The cough is productive of sputum. Associated symptoms include nasal congestion. Pertinent negatives include no fever, sore throat or shortness of breath. Nothing aggravates the symptoms. He has tried nothing for the symptoms. The treatment provided no relief. There is no history of asthma or environmental allergies.   Sinus Problem   Associated symptoms include congestion and coughing. Pertinent negatives include no shortness of breath or sore throat.       Review of Systems   Constitutional: Negative.  Negative for fever.   HENT: Positive for congestion. Negative for sore throat.    Eyes: Negative.    Respiratory: Positive for cough and sputum production. Negative for shortness of breath.    Cardiovascular: Negative.    Skin: Negative.    Endo/Heme/Allergies: Negative for environmental allergies.          Objective:     /86   Pulse 88   Temp 36.4 °C (97.6 °F)   Resp 16   Ht 1.905 m (6' 3\")   Wt 99.8 kg (220 lb)   SpO2 95%   BMI 27.50 kg/m²      Physical Exam   Constitutional: He is oriented to person, place, and time. He appears well-developed and well-nourished. No distress.   HENT:   Head: Normocephalic and atraumatic.   +maxill.sinus press.     Eyes: EOM are normal. Pupils are equal, round, and reactive to light.   Neck: Normal range of motion. Neck supple.   Cardiovascular: Normal rate.    Pulmonary/Chest: Effort normal and breath sounds normal. No respiratory distress. He has no wheezes. He has no rales.   Lymphadenopathy:     He has no cervical adenopathy.   Neurological: He is alert and oriented to " "person, place, and time.   Skin: Skin is warm and dry.   Psychiatric: He has a normal mood and affect. His behavior is normal. Thought content normal.   Nursing note and vitals reviewed.    Vitals:    09/23/17 1205   BP: 140/86   Pulse: 88   Resp: 16   Temp: 36.4 °C (97.6 °F)   SpO2: 95%   Weight: 99.8 kg (220 lb)   Height: 1.905 m (6' 3\")     Active Ambulatory Problems     Diagnosis Date Noted   • Essential hypertension 12/16/2016   • Cigarette nicotine dependence without complication 12/16/2016   • Chronic pain of right ankle 12/16/2016   • Routine general medical examination at a health care facility 12/16/2016   • Syncope 01/14/2017   • Internal carotid aneurysm 01/16/2017   • Abnormal drug screen 01/17/2017   • Arthritis 02/13/2017   • Dupuytren's contracture of both hands 02/13/2017   • Chronic use of opiate drugs therapeutic purposes 03/07/2017     Resolved Ambulatory Problems     Diagnosis Date Noted   • No Resolved Ambulatory Problems     No Additional Past Medical History     Current Outpatient Prescriptions on File Prior to Visit   Medication Sig Dispense Refill   • lisinopril (PRINIVIL) 20 MG Tab TAKE 1 TAB BY MOUTH EVERY DAY. 90 Tab 3   • ibuprofen (MOTRIN) 800 MG Tab Take 1 Tab by mouth every 8 hours as needed. 60 Tab 3   • hydrocodone/acetaminophen (NORCO)  MG Tab Take 1 Tab by mouth every 8 hours as needed. 60 Tab 0   • Diclofenac Sodium 1 % Gel Apply 1 Application to affected area(s) 2 times a day as needed. 1 Tube 4     No current facility-administered medications on file prior to visit.      Gargles, Cepacol lozenges, Aleve/Advil as needed for throat pain  Family History   Problem Relation Age of Onset   • Diabetes Father    • Heart Disease Neg Hx    • Cancer Neg Hx      Review of patient's allergies indicates no known allergies.              Assessment/Plan:     ·  sinusitis, bronchitis      · Start w/MucinexD; nsaids; [rx abx prn sx persist/worsen]  ·        "

## 2017-09-29 ENCOUNTER — TELEPHONE (OUTPATIENT)
Dept: URGENT CARE | Facility: CLINIC | Age: 62
End: 2017-09-29

## 2017-09-30 ENCOUNTER — TELEPHONE (OUTPATIENT)
Dept: URGENT CARE | Facility: CLINIC | Age: 62
End: 2017-09-30

## 2017-09-30 DIAGNOSIS — J40 BRONCHITIS: ICD-10-CM

## 2017-09-30 RX ORDER — LEVOFLOXACIN 750 MG/1
750 TABLET, FILM COATED ORAL DAILY
Qty: 10 TAB | Refills: 0 | Status: SHIPPED | OUTPATIENT
Start: 2017-09-30 | End: 2017-10-10

## 2019-12-27 ENCOUNTER — OFFICE VISIT (OUTPATIENT)
Dept: URGENT CARE | Facility: CLINIC | Age: 64
End: 2019-12-27
Payer: COMMERCIAL

## 2019-12-27 VITALS
WEIGHT: 203 LBS | RESPIRATION RATE: 14 BRPM | BODY MASS INDEX: 25.24 KG/M2 | SYSTOLIC BLOOD PRESSURE: 136 MMHG | DIASTOLIC BLOOD PRESSURE: 72 MMHG | HEART RATE: 86 BPM | OXYGEN SATURATION: 95 % | TEMPERATURE: 97.3 F | HEIGHT: 75 IN

## 2019-12-27 DIAGNOSIS — H10.33 ACUTE BACTERIAL CONJUNCTIVITIS OF BOTH EYES: ICD-10-CM

## 2019-12-27 PROCEDURE — 99214 OFFICE O/P EST MOD 30 MIN: CPT | Performed by: PHYSICIAN ASSISTANT

## 2019-12-27 RX ORDER — POLYMYXIN B SULFATE AND TRIMETHOPRIM 1; 10000 MG/ML; [USP'U]/ML
1 SOLUTION OPHTHALMIC EVERY 4 HOURS
Qty: 10 ML | Refills: 0 | Status: SHIPPED | OUTPATIENT
Start: 2019-12-27 | End: 2020-01-24

## 2019-12-27 ASSESSMENT — ENCOUNTER SYMPTOMS
SINUS PAIN: 0
HEADACHES: 0
FEVER: 0
SHORTNESS OF BREATH: 0
BLURRED VISION: 0
CHILLS: 0
EYE REDNESS: 1
COUGH: 0
EYE PAIN: 1
EYE DISCHARGE: 1
SORE THROAT: 0
PHOTOPHOBIA: 0
DOUBLE VISION: 0
PALPITATIONS: 0

## 2019-12-27 NOTE — PROGRESS NOTES
Subjective:      Rodolfo Saunders is a 63 y.o. male who presents with Eye Problem (bilateral eye burning, itching x 1 day)            Eye Problem    Both eyes are affected.This is a new problem. The current episode started yesterday. The problem occurs constantly. He does not wear contacts. Associated symptoms include an eye discharge and eye redness. Pertinent negatives include no blurred vision, double vision, fever or photophobia. He has tried commercial eye wash for the symptoms. The treatment provided no relief.       Review of Systems   Constitutional: Negative for chills, fever and malaise/fatigue.   HENT: Negative for congestion, ear pain, sinus pain and sore throat.    Eyes: Positive for pain, discharge and redness. Negative for blurred vision, double vision and photophobia.   Respiratory: Negative for cough and shortness of breath.    Cardiovascular: Negative for chest pain and palpitations.   Skin: Negative for rash.   Neurological: Negative for headaches.   All other systems reviewed and are negative.    PMH:  has no past medical history on file.  MEDS:   Current Outpatient Medications:   •  polymixin-trimethoprim (POLYTRIM) 07180-7.1 UNIT/ML-% Solution, Place 1 Drop in both eyes every 4 hours., Disp: 10 mL, Rfl: 0  •  promethazine-codeine (PHENERGAN-CODEINE) 6.25-10 MG/5ML Syrup, Take 5-10 mL by mouth 3 times a day as needed for Cough (or use qhs)., Disp: 150 mL, Rfl: 0  •  lisinopril (PRINIVIL) 20 MG Tab, TAKE 1 TAB BY MOUTH EVERY DAY., Disp: 90 Tab, Rfl: 3  •  ibuprofen (MOTRIN) 800 MG Tab, Take 1 Tab by mouth every 8 hours as needed., Disp: 60 Tab, Rfl: 3  •  Diclofenac Sodium 1 % Gel, Apply 1 Application to affected area(s) 2 times a day as needed., Disp: 1 Tube, Rfl: 4  •  hydrocodone/acetaminophen (NORCO)  MG Tab, Take 1 Tab by mouth every 8 hours as needed., Disp: 60 Tab, Rfl: 0  ALLERGIES: No Known Allergies  SURGHX:   Past Surgical History:   Procedure Laterality Date   • HERNIA  "REPAIR Right     inguinal hernia   • OTHER ORTHOPEDIC SURGERY Right     right ankle surgery   • SINUSCOPY       SOCHX:  reports that he has been smoking cigarettes. He has a 5.00 pack-year smoking history. He has never used smokeless tobacco. He reports that he does not drink alcohol or use drugs.  FH: Family history was reviewed, no pertinent findings to report  Medications, Allergies, and current problem list reviewed today in Epic       Objective:     Blood Pressure 136/72 (BP Location: Left arm, Patient Position: Sitting, BP Cuff Size: Adult)   Pulse 86   Temperature 36.3 °C (97.3 °F) (Temporal)   Respiration 14   Height 1.905 m (6' 3\")   Weight 92.1 kg (203 lb)   Oxygen Saturation 95%   Body Mass Index 25.37 kg/m²      Physical Exam  Vitals signs reviewed.   Constitutional:       General: He is not in acute distress.     Appearance: He is well-developed. He is not ill-appearing or toxic-appearing.   HENT:      Head: Normocephalic and atraumatic.      Right Ear: Hearing, tympanic membrane, ear canal and external ear normal.      Left Ear: Hearing, tympanic membrane, ear canal and external ear normal.      Nose: Nose normal.      Mouth/Throat:      Pharynx: Uvula midline. No oropharyngeal exudate.   Eyes:      General: Lids are normal. Lids are everted, no foreign bodies appreciated.      Conjunctiva/sclera:      Right eye: Right conjunctiva is injected. Exudate present.      Left eye: Left conjunctiva is injected. Exudate present.      Pupils: Pupils are equal, round, and reactive to light.   Neck:      Musculoskeletal: Full passive range of motion without pain, normal range of motion and neck supple.   Cardiovascular:      Rate and Rhythm: Regular rhythm.      Heart sounds: Normal heart sounds. No murmur. No friction rub. No gallop.    Pulmonary:      Effort: Pulmonary effort is normal. No respiratory distress.      Breath sounds: Normal breath sounds. No decreased breath sounds, wheezing or rales. "   Chest:      Chest wall: No tenderness.   Musculoskeletal: Normal range of motion.         General: No tenderness or deformity.   Skin:     General: Skin is warm and dry.      Findings: No rash.   Neurological:      Mental Status: He is alert and oriented to person, place, and time.   Psychiatric:         Speech: Speech normal.         Behavior: Behavior normal.         Thought Content: Thought content normal.         Judgment: Judgment normal.                 Assessment/Plan:       1. Acute bacterial conjunctivitis of both eyes    - polymixin-trimethoprim (POLYTRIM) 41283-9.1 UNIT/ML-% Solution; Place 1 Drop in both eyes every 4 hours.  Dispense: 10 mL; Refill: 0    Differential diagnosis, natural history, supportive care discussed. Follow-up with primary care provider within 7-10 days, emergency room precautions discussed.  Patient and/or family appears understanding of information.  Handout and review of patients diagnosis and treatment was discussed extensively.

## 2020-01-24 ENCOUNTER — OFFICE VISIT (OUTPATIENT)
Dept: URGENT CARE | Facility: CLINIC | Age: 65
End: 2020-01-24
Payer: COMMERCIAL

## 2020-01-24 VITALS
WEIGHT: 208 LBS | SYSTOLIC BLOOD PRESSURE: 140 MMHG | RESPIRATION RATE: 14 BRPM | HEIGHT: 75 IN | OXYGEN SATURATION: 95 % | DIASTOLIC BLOOD PRESSURE: 82 MMHG | BODY MASS INDEX: 25.86 KG/M2 | TEMPERATURE: 97.4 F | HEART RATE: 76 BPM

## 2020-01-24 DIAGNOSIS — J01.10 ACUTE NON-RECURRENT FRONTAL SINUSITIS: ICD-10-CM

## 2020-01-24 DIAGNOSIS — R05.9 COUGH: ICD-10-CM

## 2020-01-24 PROCEDURE — 99214 OFFICE O/P EST MOD 30 MIN: CPT | Performed by: NURSE PRACTITIONER

## 2020-01-24 RX ORDER — AMOXICILLIN AND CLAVULANATE POTASSIUM 875; 125 MG/1; MG/1
1 TABLET, FILM COATED ORAL 2 TIMES DAILY
Qty: 10 TAB | Refills: 0 | Status: SHIPPED | OUTPATIENT
Start: 2020-01-24 | End: 2020-01-29

## 2020-01-24 RX ORDER — DEXTROMETHORPHAN HYDROBROMIDE AND PROMETHAZINE HYDROCHLORIDE 15; 6.25 MG/5ML; MG/5ML
5 SYRUP ORAL 4 TIMES DAILY PRN
Qty: 100 ML | Refills: 0 | Status: SHIPPED | OUTPATIENT
Start: 2020-01-24

## 2020-01-24 ASSESSMENT — ENCOUNTER SYMPTOMS
NAUSEA: 0
STRIDOR: 0
VOMITING: 0
MYALGIAS: 0
SHORTNESS OF BREATH: 0
DIZZINESS: 0
WHEEZING: 0
HEADACHES: 1
PALPITATIONS: 0
SPUTUM PRODUCTION: 1
SINUS PAIN: 1
COUGH: 1
SORE THROAT: 0
CHILLS: 0
EYE REDNESS: 0
FEVER: 0
EYE DISCHARGE: 0

## 2020-01-24 NOTE — PROGRESS NOTES
"Subjective:   Rodolfo Saunders is a 64 y.o. male who presents for Cough (cough with phlegm, post nasal drip, headache, chest congestion x 5 days)        Cough   This is a new problem. The current episode started in the past 7 days. The problem has been gradually worsening. The problem occurs every few minutes. The cough is productive of sputum. Associated symptoms include headaches, nasal congestion and postnasal drip. Pertinent negatives include no chest pain, chills, ear pain, eye redness, fever, myalgias, rash, sore throat, shortness of breath or wheezing. He has tried OTC cough suppressant (Sinus rinses) for the symptoms. The treatment provided no relief.        Review of Systems   Constitutional: Negative for chills and fever.   HENT: Positive for congestion, postnasal drip and sinus pain. Negative for ear discharge, ear pain and sore throat.    Eyes: Negative for discharge and redness.   Respiratory: Positive for cough and sputum production. Negative for shortness of breath, wheezing and stridor.    Cardiovascular: Negative for chest pain and palpitations.   Gastrointestinal: Negative for nausea and vomiting.   Musculoskeletal: Negative for myalgias.   Skin: Negative for itching and rash.   Neurological: Positive for headaches. Negative for dizziness.   All other systems reviewed and are negative.    Patient's PMH, SocHx, SurgHx, FamHx, Drug allergies and medications reviewed.     Objective:   /82 (BP Location: Left arm, Patient Position: Sitting, BP Cuff Size: Adult)   Pulse 76   Temp 36.3 °C (97.4 °F) (Temporal)   Resp 14   Ht 1.905 m (6' 3\")   Wt 94.3 kg (208 lb)   SpO2 95%   BMI 26.00 kg/m²   Physical Exam  Vitals signs reviewed.   Constitutional:       Appearance: He is well-developed.   HENT:      Head: Normocephalic.      Right Ear: Tympanic membrane and ear canal normal. No middle ear effusion. Tympanic membrane is not perforated or erythematous.      Left Ear: Tympanic membrane and " ear canal normal.  No middle ear effusion. Tympanic membrane is not perforated or erythematous.      Nose: Mucosal edema present. No rhinorrhea.      Right Sinus: Frontal sinus tenderness present. No maxillary sinus tenderness.      Left Sinus: Frontal sinus tenderness present. No maxillary sinus tenderness.      Mouth/Throat:      Lips: Pink.      Mouth: Mucous membranes are moist.      Pharynx: Oropharynx is clear. Uvula midline. No oropharyngeal exudate, posterior oropharyngeal erythema or uvula swelling.      Tonsils: Tonsillar exudate present.   Eyes:      General: Lids are normal.      Extraocular Movements: Extraocular movements intact.      Conjunctiva/sclera: Conjunctivae normal.      Pupils: Pupils are equal, round, and reactive to light.   Neck:      Musculoskeletal: Normal range of motion.      Thyroid: No thyromegaly.   Cardiovascular:      Rate and Rhythm: Normal rate and regular rhythm.      Heart sounds: Normal heart sounds.   Pulmonary:      Effort: Pulmonary effort is normal. No respiratory distress.      Breath sounds: Normal breath sounds. No wheezing.   Lymphadenopathy:      Head:      Right side of head: No submandibular or tonsillar adenopathy.      Left side of head: No submandibular or tonsillar adenopathy.   Skin:     General: Skin is warm and dry.      Findings: No rash.   Neurological:      Mental Status: He is alert and oriented to person, place, and time.   Psychiatric:         Mood and Affect: Mood normal.         Speech: Speech normal.         Behavior: Behavior normal. Behavior is cooperative.         Thought Content: Thought content normal.         Judgment: Judgment normal.           Assessment/Plan:   Assessment    1. Acute non-recurrent frontal sinusitis  - amoxicillin-clavulanate (AUGMENTIN) 875-125 MG Tab; Take 1 Tab by mouth 2 times a day for 5 days.  Dispense: 10 Tab; Refill: 0    2. Cough  - promethazine-dextromethorphan (PROMETHAZINE-DM) 6.25-15 MG/5ML syrup; Take 5 mL by  mouth 4 times a day as needed for Cough.  Dispense: 100 mL; Refill: 0    Patient encouraged to increase clear liquid intake  Continue with OTC sinus rinses as prior.    Differential diagnosis, natural history, supportive care, and indications for immediate follow-up discussed.     **Please note that all invasive procedures during this visit were performed by myself and/or the Medical Assistant under the supervision of the PA or MD in office**

## 2020-11-24 NOTE — ASSESSMENT & PLAN NOTE
Desirae was called and given Dr. Daniels's response below.  Desirae voiced understanding.   Patient is a current smoker. States that he has tried quitting several times. States he has been unsuccessful. Patient does not currently have desire to try to quit smoking.

## 2021-03-15 DIAGNOSIS — Z23 NEED FOR VACCINATION: ICD-10-CM
